# Patient Record
Sex: FEMALE | Race: BLACK OR AFRICAN AMERICAN | NOT HISPANIC OR LATINO | URBAN - METROPOLITAN AREA
[De-identification: names, ages, dates, MRNs, and addresses within clinical notes are randomized per-mention and may not be internally consistent; named-entity substitution may affect disease eponyms.]

---

## 2019-02-08 ENCOUNTER — INPATIENT (INPATIENT)
Facility: HOSPITAL | Age: 84
LOS: 2 days | Discharge: HOME | End: 2019-02-11
Attending: INTERNAL MEDICINE | Admitting: INTERNAL MEDICINE
Payer: MEDICARE

## 2019-02-08 VITALS
OXYGEN SATURATION: 98 % | RESPIRATION RATE: 17 BRPM | DIASTOLIC BLOOD PRESSURE: 78 MMHG | WEIGHT: 164.91 LBS | SYSTOLIC BLOOD PRESSURE: 174 MMHG | HEIGHT: 63 IN | HEART RATE: 80 BPM | TEMPERATURE: 101 F

## 2019-02-08 PROCEDURE — 99285 EMERGENCY DEPT VISIT HI MDM: CPT

## 2019-02-09 DIAGNOSIS — E78.00 PURE HYPERCHOLESTEROLEMIA, UNSPECIFIED: ICD-10-CM

## 2019-02-09 DIAGNOSIS — I10 ESSENTIAL (PRIMARY) HYPERTENSION: ICD-10-CM

## 2019-02-09 DIAGNOSIS — Z29.9 ENCOUNTER FOR PROPHYLACTIC MEASURES, UNSPECIFIED: ICD-10-CM

## 2019-02-09 DIAGNOSIS — J15.9 UNSPECIFIED BACTERIAL PNEUMONIA: ICD-10-CM

## 2019-02-09 LAB
ALBUMIN SERPL ELPH-MCNC: 3.6 G/DL — SIGNIFICANT CHANGE UP (ref 3.3–5)
ALP SERPL-CCNC: 76 U/L — SIGNIFICANT CHANGE UP (ref 40–120)
ALT FLD-CCNC: 23 U/L — SIGNIFICANT CHANGE UP (ref 12–78)
ANION GAP SERPL CALC-SCNC: 12 MMOL/L — SIGNIFICANT CHANGE UP (ref 5–17)
ANION GAP SERPL CALC-SCNC: 9 MMOL/L — SIGNIFICANT CHANGE UP (ref 5–17)
APTT BLD: 34.7 SEC — SIGNIFICANT CHANGE UP (ref 28.5–37)
AST SERPL-CCNC: 19 U/L — SIGNIFICANT CHANGE UP (ref 15–37)
BASOPHILS # BLD AUTO: 0.08 K/UL — SIGNIFICANT CHANGE UP (ref 0–0.2)
BASOPHILS NFR BLD AUTO: 0.5 % — SIGNIFICANT CHANGE UP (ref 0–2)
BILIRUB SERPL-MCNC: 0.4 MG/DL — SIGNIFICANT CHANGE UP (ref 0.2–1.2)
BUN SERPL-MCNC: 29 MG/DL — HIGH (ref 7–23)
BUN SERPL-MCNC: 36 MG/DL — HIGH (ref 7–23)
CALCIUM SERPL-MCNC: 8.4 MG/DL — LOW (ref 8.5–10.1)
CALCIUM SERPL-MCNC: 9.1 MG/DL — SIGNIFICANT CHANGE UP (ref 8.5–10.1)
CHLORIDE SERPL-SCNC: 109 MMOL/L — HIGH (ref 96–108)
CHLORIDE SERPL-SCNC: 110 MMOL/L — HIGH (ref 96–108)
CO2 SERPL-SCNC: 23 MMOL/L — SIGNIFICANT CHANGE UP (ref 22–31)
CO2 SERPL-SCNC: 24 MMOL/L — SIGNIFICANT CHANGE UP (ref 22–31)
CREAT SERPL-MCNC: 1.32 MG/DL — HIGH (ref 0.5–1.3)
CREAT SERPL-MCNC: 1.34 MG/DL — HIGH (ref 0.5–1.3)
EOSINOPHIL # BLD AUTO: 0.13 K/UL — SIGNIFICANT CHANGE UP (ref 0–0.5)
EOSINOPHIL NFR BLD AUTO: 0.8 % — SIGNIFICANT CHANGE UP (ref 0–6)
FLU A RESULT: SIGNIFICANT CHANGE UP
FLU A RESULT: SIGNIFICANT CHANGE UP
FLUAV AG NPH QL: SIGNIFICANT CHANGE UP
FLUBV AG NPH QL: SIGNIFICANT CHANGE UP
GLUCOSE SERPL-MCNC: 115 MG/DL — HIGH (ref 70–99)
GLUCOSE SERPL-MCNC: 92 MG/DL — SIGNIFICANT CHANGE UP (ref 70–99)
HCT VFR BLD CALC: 31.7 % — LOW (ref 34.5–45)
HCT VFR BLD CALC: 35.8 % — SIGNIFICANT CHANGE UP (ref 34.5–45)
HGB BLD-MCNC: 10.1 G/DL — LOW (ref 11.5–15.5)
HGB BLD-MCNC: 11.8 G/DL — SIGNIFICANT CHANGE UP (ref 11.5–15.5)
IMM GRANULOCYTES NFR BLD AUTO: 0.4 % — SIGNIFICANT CHANGE UP (ref 0–1.5)
INR BLD: 0.95 RATIO — SIGNIFICANT CHANGE UP (ref 0.88–1.16)
LACTATE SERPL-SCNC: 0.8 MMOL/L — SIGNIFICANT CHANGE UP (ref 0.7–2)
LIDOCAIN IGE QN: 251 U/L — SIGNIFICANT CHANGE UP (ref 73–393)
LYMPHOCYTES # BLD AUTO: 1.25 K/UL — SIGNIFICANT CHANGE UP (ref 1–3.3)
LYMPHOCYTES # BLD AUTO: 8 % — LOW (ref 13–44)
MCHC RBC-ENTMCNC: 29.6 PG — SIGNIFICANT CHANGE UP (ref 27–34)
MCHC RBC-ENTMCNC: 30.1 PG — SIGNIFICANT CHANGE UP (ref 27–34)
MCHC RBC-ENTMCNC: 31.9 GM/DL — LOW (ref 32–36)
MCHC RBC-ENTMCNC: 33 GM/DL — SIGNIFICANT CHANGE UP (ref 32–36)
MCV RBC AUTO: 91.3 FL — SIGNIFICANT CHANGE UP (ref 80–100)
MCV RBC AUTO: 93 FL — SIGNIFICANT CHANGE UP (ref 80–100)
MONOCYTES # BLD AUTO: 1.33 K/UL — HIGH (ref 0–0.9)
MONOCYTES NFR BLD AUTO: 8.5 % — SIGNIFICANT CHANGE UP (ref 2–14)
NEUTROPHILS # BLD AUTO: 12.85 K/UL — HIGH (ref 1.8–7.4)
NEUTROPHILS NFR BLD AUTO: 81.8 % — HIGH (ref 43–77)
NRBC # BLD: 0 /100 WBCS — SIGNIFICANT CHANGE UP (ref 0–0)
NRBC # BLD: 0 /100 WBCS — SIGNIFICANT CHANGE UP (ref 0–0)
PLATELET # BLD AUTO: 230 K/UL — SIGNIFICANT CHANGE UP (ref 150–400)
PLATELET # BLD AUTO: 253 K/UL — SIGNIFICANT CHANGE UP (ref 150–400)
POTASSIUM SERPL-MCNC: 4.1 MMOL/L — SIGNIFICANT CHANGE UP (ref 3.5–5.3)
POTASSIUM SERPL-MCNC: 4.2 MMOL/L — SIGNIFICANT CHANGE UP (ref 3.5–5.3)
POTASSIUM SERPL-SCNC: 4.1 MMOL/L — SIGNIFICANT CHANGE UP (ref 3.5–5.3)
POTASSIUM SERPL-SCNC: 4.2 MMOL/L — SIGNIFICANT CHANGE UP (ref 3.5–5.3)
PROCALCITONIN SERPL-MCNC: 0.12 NG/ML — HIGH (ref 0.02–0.1)
PROT SERPL-MCNC: 7.8 GM/DL — SIGNIFICANT CHANGE UP (ref 6–8.3)
PROTHROM AB SERPL-ACNC: 10.6 SEC — SIGNIFICANT CHANGE UP (ref 10–12.9)
RAPID RVP RESULT: SIGNIFICANT CHANGE UP
RBC # BLD: 3.41 M/UL — LOW (ref 3.8–5.2)
RBC # BLD: 3.92 M/UL — SIGNIFICANT CHANGE UP (ref 3.8–5.2)
RBC # FLD: 14.2 % — SIGNIFICANT CHANGE UP (ref 10.3–14.5)
RBC # FLD: 14.5 % — SIGNIFICANT CHANGE UP (ref 10.3–14.5)
RSV RESULT: SIGNIFICANT CHANGE UP
RSV RNA RESP QL NAA+PROBE: SIGNIFICANT CHANGE UP
SODIUM SERPL-SCNC: 141 MMOL/L — SIGNIFICANT CHANGE UP (ref 135–145)
SODIUM SERPL-SCNC: 146 MMOL/L — HIGH (ref 135–145)
TROPONIN I SERPL-MCNC: <.015 NG/ML — SIGNIFICANT CHANGE UP (ref 0.01–0.04)
WBC # BLD: 15.71 K/UL — HIGH (ref 3.8–10.5)
WBC # BLD: 17.78 K/UL — HIGH (ref 3.8–10.5)
WBC # FLD AUTO: 15.71 K/UL — HIGH (ref 3.8–10.5)
WBC # FLD AUTO: 17.78 K/UL — HIGH (ref 3.8–10.5)

## 2019-02-09 PROCEDURE — 71045 X-RAY EXAM CHEST 1 VIEW: CPT | Mod: 26

## 2019-02-09 PROCEDURE — 99223 1ST HOSP IP/OBS HIGH 75: CPT | Mod: AI

## 2019-02-09 PROCEDURE — 12345: CPT | Mod: NC

## 2019-02-09 PROCEDURE — 76705 ECHO EXAM OF ABDOMEN: CPT | Mod: 26

## 2019-02-09 PROCEDURE — 76700 US EXAM ABDOM COMPLETE: CPT | Mod: 26

## 2019-02-09 PROCEDURE — 74177 CT ABD & PELVIS W/CONTRAST: CPT | Mod: 26

## 2019-02-09 PROCEDURE — 71275 CT ANGIOGRAPHY CHEST: CPT | Mod: 26

## 2019-02-09 RX ORDER — HEPARIN SODIUM 5000 [USP'U]/ML
5000 INJECTION INTRAVENOUS; SUBCUTANEOUS EVERY 8 HOURS
Qty: 0 | Refills: 0 | Status: DISCONTINUED | OUTPATIENT
Start: 2019-02-09 | End: 2019-02-11

## 2019-02-09 RX ORDER — SODIUM CHLORIDE 9 MG/ML
500 INJECTION INTRAMUSCULAR; INTRAVENOUS; SUBCUTANEOUS ONCE
Qty: 0 | Refills: 0 | Status: COMPLETED | OUTPATIENT
Start: 2019-02-09 | End: 2019-02-09

## 2019-02-09 RX ORDER — ACETAMINOPHEN 500 MG
650 TABLET ORAL ONCE
Qty: 0 | Refills: 0 | Status: COMPLETED | OUTPATIENT
Start: 2019-02-09 | End: 2019-02-09

## 2019-02-09 RX ORDER — IOHEXOL 300 MG/ML
30 INJECTION, SOLUTION INTRAVENOUS ONCE
Qty: 0 | Refills: 0 | Status: COMPLETED | OUTPATIENT
Start: 2019-02-09 | End: 2019-02-09

## 2019-02-09 RX ORDER — AZITHROMYCIN 500 MG/1
500 TABLET, FILM COATED ORAL ONCE
Qty: 0 | Refills: 0 | Status: COMPLETED | OUTPATIENT
Start: 2019-02-09 | End: 2019-02-09

## 2019-02-09 RX ORDER — ACETAMINOPHEN 500 MG
650 TABLET ORAL EVERY 6 HOURS
Qty: 0 | Refills: 0 | Status: DISCONTINUED | OUTPATIENT
Start: 2019-02-09 | End: 2019-02-11

## 2019-02-09 RX ORDER — CEFTRIAXONE 500 MG/1
1 INJECTION, POWDER, FOR SOLUTION INTRAMUSCULAR; INTRAVENOUS EVERY 24 HOURS
Qty: 0 | Refills: 0 | Status: DISCONTINUED | OUTPATIENT
Start: 2019-02-10 | End: 2019-02-11

## 2019-02-09 RX ORDER — CEFTRIAXONE 500 MG/1
1 INJECTION, POWDER, FOR SOLUTION INTRAMUSCULAR; INTRAVENOUS ONCE
Qty: 0 | Refills: 0 | Status: COMPLETED | OUTPATIENT
Start: 2019-02-09 | End: 2019-02-09

## 2019-02-09 RX ORDER — PANTOPRAZOLE SODIUM 20 MG/1
40 TABLET, DELAYED RELEASE ORAL ONCE
Qty: 0 | Refills: 0 | Status: COMPLETED | OUTPATIENT
Start: 2019-02-09 | End: 2019-02-09

## 2019-02-09 RX ORDER — METOPROLOL TARTRATE 50 MG
25 TABLET ORAL DAILY
Qty: 0 | Refills: 0 | Status: DISCONTINUED | OUTPATIENT
Start: 2019-02-09 | End: 2019-02-11

## 2019-02-09 RX ORDER — SODIUM CHLORIDE 9 MG/ML
3 INJECTION INTRAMUSCULAR; INTRAVENOUS; SUBCUTANEOUS ONCE
Qty: 0 | Refills: 0 | Status: COMPLETED | OUTPATIENT
Start: 2019-02-09 | End: 2019-02-09

## 2019-02-09 RX ORDER — HYDROCHLOROTHIAZIDE 25 MG
25 TABLET ORAL DAILY
Qty: 0 | Refills: 0 | Status: DISCONTINUED | OUTPATIENT
Start: 2019-02-09 | End: 2019-02-11

## 2019-02-09 RX ORDER — AZITHROMYCIN 500 MG/1
500 TABLET, FILM COATED ORAL DAILY
Qty: 0 | Refills: 0 | Status: DISCONTINUED | OUTPATIENT
Start: 2019-02-10 | End: 2019-02-11

## 2019-02-09 RX ORDER — IPRATROPIUM/ALBUTEROL SULFATE 18-103MCG
3 AEROSOL WITH ADAPTER (GRAM) INHALATION EVERY 6 HOURS
Qty: 0 | Refills: 0 | Status: DISCONTINUED | OUTPATIENT
Start: 2019-02-09 | End: 2019-02-11

## 2019-02-09 RX ORDER — ONDANSETRON 8 MG/1
4 TABLET, FILM COATED ORAL ONCE
Qty: 0 | Refills: 0 | Status: COMPLETED | OUTPATIENT
Start: 2019-02-09 | End: 2019-02-09

## 2019-02-09 RX ORDER — ASPIRIN/CALCIUM CARB/MAGNESIUM 324 MG
81 TABLET ORAL DAILY
Qty: 0 | Refills: 0 | Status: DISCONTINUED | OUTPATIENT
Start: 2019-02-09 | End: 2019-02-11

## 2019-02-09 RX ORDER — SODIUM CHLORIDE 9 MG/ML
1000 INJECTION INTRAMUSCULAR; INTRAVENOUS; SUBCUTANEOUS ONCE
Qty: 0 | Refills: 0 | Status: COMPLETED | OUTPATIENT
Start: 2019-02-09 | End: 2019-02-09

## 2019-02-09 RX ORDER — HYDRALAZINE HCL 50 MG
50 TABLET ORAL THREE TIMES A DAY
Qty: 0 | Refills: 0 | Status: DISCONTINUED | OUTPATIENT
Start: 2019-02-09 | End: 2019-02-11

## 2019-02-09 RX ORDER — ATORVASTATIN CALCIUM 80 MG/1
20 TABLET, FILM COATED ORAL AT BEDTIME
Qty: 0 | Refills: 0 | Status: DISCONTINUED | OUTPATIENT
Start: 2019-02-09 | End: 2019-02-11

## 2019-02-09 RX ORDER — LOSARTAN POTASSIUM 100 MG/1
100 TABLET, FILM COATED ORAL DAILY
Qty: 0 | Refills: 0 | Status: DISCONTINUED | OUTPATIENT
Start: 2019-02-09 | End: 2019-02-11

## 2019-02-09 RX ADMIN — SODIUM CHLORIDE 1000 MILLILITER(S): 9 INJECTION INTRAMUSCULAR; INTRAVENOUS; SUBCUTANEOUS at 00:43

## 2019-02-09 RX ADMIN — SODIUM CHLORIDE 500 MILLILITER(S): 9 INJECTION INTRAMUSCULAR; INTRAVENOUS; SUBCUTANEOUS at 17:32

## 2019-02-09 RX ADMIN — SODIUM CHLORIDE 1000 MILLILITER(S): 9 INJECTION INTRAMUSCULAR; INTRAVENOUS; SUBCUTANEOUS at 01:15

## 2019-02-09 RX ADMIN — SODIUM CHLORIDE 3 MILLILITER(S): 9 INJECTION INTRAMUSCULAR; INTRAVENOUS; SUBCUTANEOUS at 00:43

## 2019-02-09 RX ADMIN — ATORVASTATIN CALCIUM 20 MILLIGRAM(S): 80 TABLET, FILM COATED ORAL at 21:38

## 2019-02-09 RX ADMIN — Medication 650 MILLIGRAM(S): at 05:14

## 2019-02-09 RX ADMIN — Medication 650 MILLIGRAM(S): at 04:14

## 2019-02-09 RX ADMIN — IOHEXOL 30 MILLILITER(S): 300 INJECTION, SOLUTION INTRAVENOUS at 01:15

## 2019-02-09 RX ADMIN — HEPARIN SODIUM 5000 UNIT(S): 5000 INJECTION INTRAVENOUS; SUBCUTANEOUS at 14:12

## 2019-02-09 RX ADMIN — HEPARIN SODIUM 5000 UNIT(S): 5000 INJECTION INTRAVENOUS; SUBCUTANEOUS at 21:39

## 2019-02-09 RX ADMIN — PANTOPRAZOLE SODIUM 40 MILLIGRAM(S): 20 TABLET, DELAYED RELEASE ORAL at 00:43

## 2019-02-09 RX ADMIN — Medication 50 MILLIGRAM(S): at 21:38

## 2019-02-09 RX ADMIN — ONDANSETRON 4 MILLIGRAM(S): 8 TABLET, FILM COATED ORAL at 00:43

## 2019-02-09 RX ADMIN — SODIUM CHLORIDE 1000 MILLILITER(S): 9 INJECTION INTRAMUSCULAR; INTRAVENOUS; SUBCUTANEOUS at 15:14

## 2019-02-09 RX ADMIN — CEFTRIAXONE 100 GRAM(S): 500 INJECTION, POWDER, FOR SOLUTION INTRAMUSCULAR; INTRAVENOUS at 07:27

## 2019-02-09 RX ADMIN — Medication 81 MILLIGRAM(S): at 11:31

## 2019-02-09 RX ADMIN — AZITHROMYCIN 255 MILLIGRAM(S): 500 TABLET, FILM COATED ORAL at 11:31

## 2019-02-09 RX ADMIN — Medication 650 MILLIGRAM(S): at 23:09

## 2019-02-09 NOTE — H&P ADULT - NSHPREVIEWOFSYSTEMS_GEN_ALL_CORE
No photophobia/eye pain/changes in vision,  No palpitations, no SOB/cough/wheeze/stridor, No abdominal pain, No N/V/D, no dysuria/frequency/discharge, No neck/back pain, no rash, no changes in neurological status/function.

## 2019-02-09 NOTE — ED PROVIDER NOTE - OBJECTIVE STATEMENT
Pertinent PMH/PSH/FHx/SHx and Review of Systems contained within:  84 yo f with pmh of htn, hld and temporal arteritis resolved presents in ED c/o right sided chest pain associated with fever. pain worsens with deep inspiration. No aggravating or relieving factors, No fever/chills, No photophobia/eye pain/changes in vision, No ear pain/sore throat/dysphagia, No palpitations, no SOB/cough/wheeze/stridor, No abdominal pain, No N/V/D, no dysuria/frequency/discharge, No neck/back pain, no rash, no changes in neurological status/function.

## 2019-02-09 NOTE — H&P ADULT - NSHPLABSRESULTS_GEN_ALL_CORE
Vital Signs Last 24 Hrs  T(C): 37.6 (09 Feb 2019 04:10), Max: 38.6 (08 Feb 2019 23:44)  T(F): 99.6 (09 Feb 2019 04:10), Max: 101.5 (08 Feb 2019 23:44)  HR: 68 (09 Feb 2019 04:10) (68 - 80)  BP: 152/67 (09 Feb 2019 04:10) (152/67 - 174/78)  BP(mean): --  RR: 18 (09 Feb 2019 04:10) (17 - 18)  SpO2: 97% (09 Feb 2019 04:10) (97% - 98%)        LABS:                        11.8   15.71 )-----------( 253      ( 09 Feb 2019 01:11 )             35.8     02-09    141  |  109<H>  |  36<H>  ----------------------------<  115<H>  4.1   |  23  |  1.32<H>    Ca    9.1      09 Feb 2019 01:11    TPro  7.8  /  Alb  3.6  /  TBili  0.4  /  DBili  x   /  AST  19  /  ALT  23  /  AlkPhos  76  02-09    PT/INR - ( 09 Feb 2019 01:11 )   PT: 10.6 sec;   INR: 0.95 ratio         PTT - ( 09 Feb 2019 01:11 )  PTT:34.7 sec      RADIOLOGY & ADDITIONAL STUDIES:    CXR:  RLL infiltrate

## 2019-02-09 NOTE — H&P ADULT - HISTORY OF PRESENT ILLNESS
83 year old woman with PMH HTN, HLD, and migraines presents with sharp right lower pleuritic chest pain and subjective fevers with chills for the last 2-3 days.  She denies wheezing, SOB, cough.  She denies recent travel, sick contacts, palpitations, diaphoresis, lightheadedness.    In the ED, CXR shows RLL infiltrate, tmax 101.5, WBC 15.71.

## 2019-02-09 NOTE — ED ADULT NURSE NOTE - NSIMPLEMENTINTERV_GEN_ALL_ED
Implemented All Universal Safety Interventions:  [place] to call system. Call bell, personal items and telephone within reach. Instruct patient to call for assistance. Room bathroom lighting operational. Non-slip footwear when patient is off stretcher. Physically safe environment: no spills, clutter or unnecessary equipment. Stretcher in lowest position, wheels locked, appropriate side rails in place.

## 2019-02-09 NOTE — CHART NOTE - NSCHARTNOTEFT_GEN_A_CORE
Patient seen and examined bedside. Patient seen and examined bedside.    Vitals stable.     GENERAL: NAD, sitting in bed comfortably   HEAD:  Atraumatic, Normocephalic  EYES: EOMI, PERRLA, conjunctiva and sclera clear  ENMT: Moist mucous membranes  NECK: Supple, No JVD  NERVOUS SYSTEM:  Alert & Oriented X3, no focal neuro deficits   CHEST/LUNG: Clear to auscultation bilaterally; No rales, rhonchi, wheezing, or rubs  HEART: Regular rate and rhythm; No murmurs, rubs, or gallops  ABDOMEN: Soft, Nontender, Nondistended; +BS  EXTREMITIES:  2+ Peripheral Pulses b/l, No clubbing, cyanosis, or edema b/l, no calf tenderness b/l    Labs and imaging reviewed.     Given recent travel history 2 weeks back, obtain CT angio to rule out PE and b/l LE doppler to r/o DVT.   follow procal  continue with abx   the rest of management per H&P

## 2019-02-09 NOTE — H&P ADULT - PROBLEM SELECTOR PLAN 1
Continue Rocephin 1gm IVPB QDaily.  Continue Zithromax 500mg PO QDAILY.  O2 via NC@ 2L/Min, keep sat >94% at all times.  Follow Blood Cx 2  Albuterol/Atrovent 1 unit neb Q6hrs.  Tylenol 650mg po q6hrs PRN for fever.  Robitussin 10ml PO Q8hrs PRN for cough.

## 2019-02-09 NOTE — H&P ADULT - ASSESSMENT
83 year old woman with PMH HTN, HLD, and migraines presents with sharp right lower pleuritic chest pain and subjective fevers with chills for the last 2-3 days.  Patient will require admission for at least 2 midnights as detailed below:    IMPROVE VTE Individual Risk Assessment          RISK                                                          Points    [  ] Previous VTE                                                3    [  ] Thrombophilia                                             2    [  ] Lower limb paralysis                                    2        (unable to hold up >15 seconds)      [  ] Current Cancer                                             2         (within 6 months)    [ x ] Immobilization > 24 hrs                              1    [  ] ICU/CCU stay > 24 hours                            1    [ x ] Age > 60                                                    1    IMPROVE VTE Score ___2______

## 2019-02-09 NOTE — H&P ADULT - NSHPPHYSICALEXAM_GEN_ALL_CORE
GENERAL: NAD, well-groomed, well-developed  HEAD:  Atraumatic, Normocephalic  EYES: EOMI, PERRLA, conjunctiva and sclera clear  ENMT: No tonsillar erythema, exudates, or enlargement; Moist mucous membranes, No lesions  NECK: Supple, No JVD, Normal thyroid  NERVOUS SYSTEM:  Alert & Oriented X3, Good concentration  CHEST/LUNG: Clear to ascultation bilaterally; No rales, rhonchi, wheezing, or rubs  HEART: Regular rate and rhythm; No murmurs, rubs, or gallops  ABDOMEN: Soft, Nontender, Nondistended; Bowel sounds present  EXTREMITIES: no clubbing, cyanosis, or edema  SKIN: no rashes or lesions   PSYCH: normal affect and behavior

## 2019-02-10 LAB
ANION GAP SERPL CALC-SCNC: 6 MMOL/L — SIGNIFICANT CHANGE UP (ref 5–17)
APPEARANCE UR: CLEAR — SIGNIFICANT CHANGE UP
BILIRUB UR-MCNC: NEGATIVE — SIGNIFICANT CHANGE UP
BUN SERPL-MCNC: 29 MG/DL — HIGH (ref 7–23)
CALCIUM SERPL-MCNC: 8.9 MG/DL — SIGNIFICANT CHANGE UP (ref 8.5–10.1)
CHLORIDE SERPL-SCNC: 111 MMOL/L — HIGH (ref 96–108)
CO2 SERPL-SCNC: 24 MMOL/L — SIGNIFICANT CHANGE UP (ref 22–31)
COLOR SPEC: YELLOW — SIGNIFICANT CHANGE UP
CREAT SERPL-MCNC: 1.71 MG/DL — HIGH (ref 0.5–1.3)
DIFF PNL FLD: NEGATIVE — SIGNIFICANT CHANGE UP
GLUCOSE SERPL-MCNC: 117 MG/DL — HIGH (ref 70–99)
GLUCOSE UR QL: NEGATIVE MG/DL — SIGNIFICANT CHANGE UP
HCT VFR BLD CALC: 33.6 % — LOW (ref 34.5–45)
HGB BLD-MCNC: 10.8 G/DL — LOW (ref 11.5–15.5)
KETONES UR-MCNC: NEGATIVE — SIGNIFICANT CHANGE UP
LEUKOCYTE ESTERASE UR-ACNC: NEGATIVE — SIGNIFICANT CHANGE UP
MAGNESIUM SERPL-MCNC: 2.3 MG/DL — SIGNIFICANT CHANGE UP (ref 1.6–2.6)
MCHC RBC-ENTMCNC: 30 PG — SIGNIFICANT CHANGE UP (ref 27–34)
MCHC RBC-ENTMCNC: 32.1 GM/DL — SIGNIFICANT CHANGE UP (ref 32–36)
MCV RBC AUTO: 93.3 FL — SIGNIFICANT CHANGE UP (ref 80–100)
NITRITE UR-MCNC: NEGATIVE — SIGNIFICANT CHANGE UP
NRBC # BLD: 0 /100 WBCS — SIGNIFICANT CHANGE UP (ref 0–0)
PH UR: 5 — SIGNIFICANT CHANGE UP (ref 5–8)
PHOSPHATE SERPL-MCNC: 3.7 MG/DL — SIGNIFICANT CHANGE UP (ref 2.5–4.5)
PLATELET # BLD AUTO: 238 K/UL — SIGNIFICANT CHANGE UP (ref 150–400)
POTASSIUM SERPL-MCNC: 4.2 MMOL/L — SIGNIFICANT CHANGE UP (ref 3.5–5.3)
POTASSIUM SERPL-SCNC: 4.2 MMOL/L — SIGNIFICANT CHANGE UP (ref 3.5–5.3)
PROT UR-MCNC: NEGATIVE MG/DL — SIGNIFICANT CHANGE UP
RBC # BLD: 3.6 M/UL — LOW (ref 3.8–5.2)
RBC # FLD: 14.5 % — SIGNIFICANT CHANGE UP (ref 10.3–14.5)
SODIUM SERPL-SCNC: 141 MMOL/L — SIGNIFICANT CHANGE UP (ref 135–145)
SP GR SPEC: 1.01 — SIGNIFICANT CHANGE UP (ref 1.01–1.02)
UROBILINOGEN FLD QL: NEGATIVE MG/DL — SIGNIFICANT CHANGE UP
WBC # BLD: 16.1 K/UL — HIGH (ref 3.8–10.5)
WBC # FLD AUTO: 16.1 K/UL — HIGH (ref 3.8–10.5)

## 2019-02-10 PROCEDURE — 93970 EXTREMITY STUDY: CPT | Mod: 26

## 2019-02-10 PROCEDURE — 99232 SBSQ HOSP IP/OBS MODERATE 35: CPT

## 2019-02-10 RX ORDER — SODIUM CHLORIDE 9 MG/ML
500 INJECTION INTRAMUSCULAR; INTRAVENOUS; SUBCUTANEOUS ONCE
Qty: 0 | Refills: 0 | Status: COMPLETED | OUTPATIENT
Start: 2019-02-10 | End: 2019-02-10

## 2019-02-10 RX ADMIN — AZITHROMYCIN 500 MILLIGRAM(S): 500 TABLET, FILM COATED ORAL at 11:10

## 2019-02-10 RX ADMIN — HEPARIN SODIUM 5000 UNIT(S): 5000 INJECTION INTRAVENOUS; SUBCUTANEOUS at 05:33

## 2019-02-10 RX ADMIN — Medication 50 MILLIGRAM(S): at 21:41

## 2019-02-10 RX ADMIN — SODIUM CHLORIDE 500 MILLILITER(S): 9 INJECTION INTRAMUSCULAR; INTRAVENOUS; SUBCUTANEOUS at 19:25

## 2019-02-10 RX ADMIN — ATORVASTATIN CALCIUM 20 MILLIGRAM(S): 80 TABLET, FILM COATED ORAL at 21:41

## 2019-02-10 RX ADMIN — CEFTRIAXONE 100 GRAM(S): 500 INJECTION, POWDER, FOR SOLUTION INTRAMUSCULAR; INTRAVENOUS at 05:31

## 2019-02-10 RX ADMIN — HEPARIN SODIUM 5000 UNIT(S): 5000 INJECTION INTRAVENOUS; SUBCUTANEOUS at 13:00

## 2019-02-10 RX ADMIN — Medication 81 MILLIGRAM(S): at 11:10

## 2019-02-10 RX ADMIN — Medication 50 MILLIGRAM(S): at 13:00

## 2019-02-10 RX ADMIN — LOSARTAN POTASSIUM 100 MILLIGRAM(S): 100 TABLET, FILM COATED ORAL at 11:10

## 2019-02-10 RX ADMIN — Medication 25 MILLIGRAM(S): at 11:10

## 2019-02-10 RX ADMIN — HEPARIN SODIUM 5000 UNIT(S): 5000 INJECTION INTRAVENOUS; SUBCUTANEOUS at 21:42

## 2019-02-10 RX ADMIN — Medication 25 MILLIGRAM(S): at 05:32

## 2019-02-10 NOTE — PROGRESS NOTE ADULT - ASSESSMENT
83 year old woman with PMH HTN, HLD, and migraines presents with sharp right lower pleuritic chest pain and subjective fevers with chills for the last 2-3 days.

## 2019-02-10 NOTE — PROGRESS NOTE ADULT - SUBJECTIVE AND OBJECTIVE BOX
HPI:  83 year old woman with PMH HTN, HLD, and migraines presents with sharp right lower pleuritic chest pain and subjective fevers with chills for the last 2-3 days.  She denies wheezing, SOB, cough.  She denies recent travel, sick contacts, palpitations, diaphoresis, lightheadedness.    In the ED, CXR shows RLL infiltrate, tmax 101.5, WBC 15.71. (09 Feb 2019 06:10)      SUBJECTIVE & OBJECTIVE:   Pt seen and examined at bedside.   No overnight events.   No complaints. States the Rt chest pain has improved significantly.   Denies N/V, dizziness, HA, CP, palpitations, SOB, abdominal pain, dysuria, diarrhea, constipation.     PHYSICAL EXAM:  Vitals noted.     GENERAL: NAD, sitting in bed comfortably   HEAD:  Atraumatic, Normocephalic  EYES: EOMI, PERRLA, conjunctiva and sclera clear  ENMT: Moist mucous membranes  NECK: Supple, No JVD  NERVOUS SYSTEM:  Alert & Oriented X3, no focal neuro deficits   CHEST/LUNG: Clear to auscultation bilaterally; No rales, rhonchi, wheezing, or rubs  HEART: Regular rate and rhythm; No murmurs, rubs, or gallops  ABDOMEN: Soft, Nontender, Nondistended; +BS  EXTREMITIES:  2+ Peripheral Pulses b/l, No clubbing, cyanosis, or edema b/l, no calf tenderness b/l    MEDICATIONS  (STANDING):  aspirin  chewable 81 milliGRAM(s) Oral daily  atorvastatin 20 milliGRAM(s) Oral at bedtime  azithromycin   Tablet 500 milliGRAM(s) Oral daily  cefTRIAXone   IVPB 1 Gram(s) IV Intermittent every 24 hours  heparin  Injectable 5000 Unit(s) SubCutaneous every 8 hours  hydrALAZINE 50 milliGRAM(s) Oral three times a day  hydrochlorothiazide 25 milliGRAM(s) Oral daily  losartan 100 milliGRAM(s) Oral daily  metoprolol succinate ER 25 milliGRAM(s) Oral daily    MEDICATIONS  (PRN):  acetaminophen   Tablet .. 650 milliGRAM(s) Oral every 6 hours PRN Temp greater or equal to 38C (100.4F), Mild Pain (1 - 3)  ALBUTerol/ipratropium for Nebulization 3 milliLiter(s) Nebulizer every 6 hours PRN Shortness of Breath and/or Wheezing  guaiFENesin    Syrup 200 milliGRAM(s) Oral every 6 hours PRN Cough      Labs and imaging reviewed.     Procalcitonin, Serum (02.09.19 @ 22:06)    Procalcitonin, Serum: 0.12: This assay is intended for use to determine the change of PCT over time  as an aid in assessing the cumulative 28-day risk of all-cause mortality  for patients diagnosed with severe sepsis or septic shock in the ICU, or  when obtained in the emergency department or other medical wards prior to  ICU admission. This assay was performed by the Roche HiConversion.ruHMS PCT  assay. ng/mL               RADIOLOGY & ADDITIONAL TESTS:  < from: CT Angio Chest w/ IV Cont (02.09.19 @ 16:35) >  IMPRESSION:      1. No CT evidence of pulmonary embolism.  2. Bilateral lower lobe bronchiectasis and fibrotic changes with   superimposed patchy infiltrates in both lungs. Pulmonary hypertension is   suggested with prominence of the pulmonary arteries.    < end of copied text >    < from: CT Abdomen and Pelvis w/ Oral Cont and w/ IV Cont (02.09.19 @ 02:29) >    FINDINGS:    LOWER CHEST: Bibasilar atelectasis. Mild cardiomegaly. Trace pericardial   fluid.    LIVER: Within normal limits.  BILE DUCTS: Normal caliber.  GALLBLADDER: Cholecystectomy.  SPLEEN: Within normal limits.  PANCREAS: Within normal limits.  ADRENALS: Within normal limits.  KIDNEYS/URETERS: No hydronephrosisor urinary tract calculi. 1.1 cm   cystic structure adjacent to the distal left ureter is of uncertain   etiology but likely of no clinical significance (2-5).    BLADDER: Within normal limits.  REPRODUCTIVE ORGANS: Within normal limits.     BOWEL: No bowel obstruction. Appendix not identified. Colonic   diverticulosis. Small hiatal hernia.  PERITONEUM: No ascites.  VESSELS:  Mild atherosclerotic disease.  LYMPH NODES: No lymphadenopathy.    ABDOMINAL WALL: Within normal limits.  BONES: No suspicious osseous lesion.  OTHER:  None                                                                 IMPRESSION: No acute CT findings to account for etiology of patient's   symptoms.    < end of copied text >        Imaging Personally Reviewed:  [x ] YES  [ ] NO    Consultant(s) Notes Reviewed:  [x ] YES  [ ] NO    Care Discussed with Consultants/Other Providers [x ] YES  [ ] NO    Care discussed in detail with patient.  All questions and concerns addressed.

## 2019-02-10 NOTE — PROGRESS NOTE ADULT - PROBLEM SELECTOR PLAN 1
Afebrile, no leukocytosis, procal slightly elevated, cultures NGTD  CT angio obtained due to recent travel history shows b/l Lower lobe infiltrates   continue with azithromycin and Rocephin x 5 days   Albuterol/Atrovent 1 unit neb Q6hrs.  Tylenol 650mg po q6hrs PRN for fever.  Robitussin 10ml PO Q8hrs PRN for cough.

## 2019-02-11 ENCOUNTER — CHART COPY (OUTPATIENT)
Age: 84
End: 2019-02-11

## 2019-02-11 ENCOUNTER — TRANSCRIPTION ENCOUNTER (OUTPATIENT)
Age: 84
End: 2019-02-11

## 2019-02-11 VITALS
DIASTOLIC BLOOD PRESSURE: 64 MMHG | HEART RATE: 62 BPM | SYSTOLIC BLOOD PRESSURE: 145 MMHG | TEMPERATURE: 98 F | OXYGEN SATURATION: 99 % | RESPIRATION RATE: 18 BRPM

## 2019-02-11 DIAGNOSIS — N17.9 ACUTE KIDNEY FAILURE, UNSPECIFIED: ICD-10-CM

## 2019-02-11 LAB
ANION GAP SERPL CALC-SCNC: 7 MMOL/L — SIGNIFICANT CHANGE UP (ref 5–17)
BUN SERPL-MCNC: 30 MG/DL — HIGH (ref 7–23)
CALCIUM SERPL-MCNC: 8.4 MG/DL — LOW (ref 8.5–10.1)
CHLORIDE SERPL-SCNC: 113 MMOL/L — HIGH (ref 96–108)
CO2 SERPL-SCNC: 25 MMOL/L — SIGNIFICANT CHANGE UP (ref 22–31)
CREAT SERPL-MCNC: 1.5 MG/DL — HIGH (ref 0.5–1.3)
GLUCOSE SERPL-MCNC: 87 MG/DL — SIGNIFICANT CHANGE UP (ref 70–99)
HCT VFR BLD CALC: 33 % — LOW (ref 34.5–45)
HGB BLD-MCNC: 10.4 G/DL — LOW (ref 11.5–15.5)
MAGNESIUM SERPL-MCNC: 2.1 MG/DL — SIGNIFICANT CHANGE UP (ref 1.6–2.6)
MCHC RBC-ENTMCNC: 29.3 PG — SIGNIFICANT CHANGE UP (ref 27–34)
MCHC RBC-ENTMCNC: 31.5 GM/DL — LOW (ref 32–36)
MCV RBC AUTO: 93 FL — SIGNIFICANT CHANGE UP (ref 80–100)
NRBC # BLD: 0 /100 WBCS — SIGNIFICANT CHANGE UP (ref 0–0)
PHOSPHATE SERPL-MCNC: 3.4 MG/DL — SIGNIFICANT CHANGE UP (ref 2.5–4.5)
PLATELET # BLD AUTO: 230 K/UL — SIGNIFICANT CHANGE UP (ref 150–400)
POTASSIUM SERPL-MCNC: 4.2 MMOL/L — SIGNIFICANT CHANGE UP (ref 3.5–5.3)
POTASSIUM SERPL-SCNC: 4.2 MMOL/L — SIGNIFICANT CHANGE UP (ref 3.5–5.3)
RBC # BLD: 3.55 M/UL — LOW (ref 3.8–5.2)
RBC # FLD: 14.4 % — SIGNIFICANT CHANGE UP (ref 10.3–14.5)
SODIUM SERPL-SCNC: 145 MMOL/L — SIGNIFICANT CHANGE UP (ref 135–145)
WBC # BLD: 10.26 K/UL — SIGNIFICANT CHANGE UP (ref 3.8–10.5)
WBC # FLD AUTO: 10.26 K/UL — SIGNIFICANT CHANGE UP (ref 3.8–10.5)

## 2019-02-11 PROCEDURE — 99239 HOSP IP/OBS DSCHRG MGMT >30: CPT

## 2019-02-11 RX ORDER — CIPROFLOXACIN LACTATE 400MG/40ML
1 VIAL (ML) INTRAVENOUS
Qty: 5 | Refills: 0
Start: 2019-02-11 | End: 2019-02-15

## 2019-02-11 RX ADMIN — Medication 50 MILLIGRAM(S): at 13:44

## 2019-02-11 RX ADMIN — Medication 50 MILLIGRAM(S): at 05:05

## 2019-02-11 RX ADMIN — Medication 25 MILLIGRAM(S): at 05:05

## 2019-02-11 RX ADMIN — LOSARTAN POTASSIUM 100 MILLIGRAM(S): 100 TABLET, FILM COATED ORAL at 05:05

## 2019-02-11 RX ADMIN — Medication 81 MILLIGRAM(S): at 11:50

## 2019-02-11 RX ADMIN — HEPARIN SODIUM 5000 UNIT(S): 5000 INJECTION INTRAVENOUS; SUBCUTANEOUS at 05:06

## 2019-02-11 RX ADMIN — HEPARIN SODIUM 5000 UNIT(S): 5000 INJECTION INTRAVENOUS; SUBCUTANEOUS at 13:46

## 2019-02-11 RX ADMIN — CEFTRIAXONE 100 GRAM(S): 500 INJECTION, POWDER, FOR SOLUTION INTRAMUSCULAR; INTRAVENOUS at 05:05

## 2019-02-11 RX ADMIN — AZITHROMYCIN 500 MILLIGRAM(S): 500 TABLET, FILM COATED ORAL at 11:50

## 2019-02-11 NOTE — DISCHARGE NOTE PROVIDER - NSDCCPCAREPLAN_GEN_ALL_CORE_FT
PRINCIPAL DISCHARGE DIAGNOSIS  Problem: Community acquired bacterial pneumonia  Assessment and Plan of Treatment: Complete PO ABX  Follow up with PMD      SECONDARY DISCHARGE DIAGNOSES  Problem: Hypercholesteremia  Assessment and Plan of Treatment: Continue medications as prescribed  Follow up with PMD  Heart healthy diet - [email or web address]      Problem: Essential hypertension  Assessment and Plan of Treatment: Continue medications as prescribed  Follow up with PMD  Low-sodium diet  AHA Recipes - [email or web address]

## 2019-02-11 NOTE — DISCHARGE NOTE NURSING/CASE MANAGEMENT/SOCIAL WORK - NSDCDPATPORTLINK_GEN_ALL_CORE
You can access the [facility] Patient Portal, offered by [facility], by registering with the following website: [email or web address]

## 2019-02-11 NOTE — DISCHARGE NOTE PROVIDER - CARE PROVIDER_API CALL
Handy Alvarez)  Internal Medicine  [address] [place] [address]  Phone: [phone]  Fax: [phone]  Follow Up Time: 1 week

## 2019-02-11 NOTE — DISCHARGE NOTE PROVIDER - HOSPITAL COURSE
83 year old woman with PMH HTN, HLD, and migraines presents with sharp right lower pleuritic chest pain and subjective fevers with chills for the last 2-3 days.          Community acquired bacterial pneumonia.  Plan: Afebrile, no leukocytosis, procal slightly elevated, cultures NGTD    CT angio obtained due to recent travel history shows b/l Lower lobe infiltrates     switch to levaquin po, f/u with pcp        Pt with CKD 3, unlikely barak on admit as there is no baseline             45min spent on dc planning

## 2019-02-12 ENCOUNTER — CHART COPY (OUTPATIENT)
Age: 84
End: 2019-02-12

## 2019-02-12 DIAGNOSIS — G43.909 MIGRAINE, UNSPECIFIED, NOT INTRACTABLE, W/OUT STATUS MIGRAINOSUS: ICD-10-CM

## 2019-02-12 LAB
CULTURE RESULTS: NO GROWTH — SIGNIFICANT CHANGE UP
SPECIMEN SOURCE: SIGNIFICANT CHANGE UP

## 2019-02-12 RX ORDER — ASPIRIN 81 MG/1
81 TABLET, CHEWABLE ORAL DAILY
Refills: 0 | Status: ACTIVE | COMMUNITY
Start: 2019-02-12

## 2019-02-12 RX ORDER — CALCIUM CARBONATE/VITAMIN D3 500 MG-2.5
500-100 TABLET,CHEWABLE ORAL
Refills: 0 | Status: ACTIVE | COMMUNITY
Start: 2019-02-12

## 2019-02-12 RX ORDER — CHROMIUM 200 MCG
1000 TABLET ORAL DAILY
Refills: 0 | Status: ACTIVE | COMMUNITY
Start: 2019-02-12

## 2019-02-12 RX ORDER — ROSUVASTATIN CALCIUM 5 MG/1
5 TABLET, FILM COATED ORAL DAILY
Refills: 0 | Status: ACTIVE | COMMUNITY
Start: 2019-02-12

## 2019-02-14 ENCOUNTER — APPOINTMENT (OUTPATIENT)
Age: 84
End: 2019-02-14
Payer: MEDICARE

## 2019-02-14 VITALS
OXYGEN SATURATION: 98 % | BODY MASS INDEX: 29.23 KG/M2 | HEART RATE: 78 BPM | DIASTOLIC BLOOD PRESSURE: 60 MMHG | SYSTOLIC BLOOD PRESSURE: 166 MMHG | RESPIRATION RATE: 16 BRPM | TEMPERATURE: 97.6 F

## 2019-02-14 DIAGNOSIS — E78.5 HYPERLIPIDEMIA, UNSPECIFIED: ICD-10-CM

## 2019-02-14 DIAGNOSIS — R07.9 CHEST PAIN, UNSPECIFIED: ICD-10-CM

## 2019-02-14 DIAGNOSIS — Z79.82 LONG TERM (CURRENT) USE OF ASPIRIN: ICD-10-CM

## 2019-02-14 DIAGNOSIS — J18.9 PNEUMONIA, UNSPECIFIED ORGANISM: ICD-10-CM

## 2019-02-14 DIAGNOSIS — G43.909 MIGRAINE, UNSPECIFIED, NOT INTRACTABLE, WITHOUT STATUS MIGRAINOSUS: ICD-10-CM

## 2019-02-14 DIAGNOSIS — N18.3 CHRONIC KIDNEY DISEASE, STAGE 3 (MODERATE): ICD-10-CM

## 2019-02-14 DIAGNOSIS — Z88.2 ALLERGY STATUS TO SULFONAMIDES: ICD-10-CM

## 2019-02-14 DIAGNOSIS — I12.9 HYPERTENSIVE CHRONIC KIDNEY DISEASE WITH STAGE 1 THROUGH STAGE 4 CHRONIC KIDNEY DISEASE, OR UNSPECIFIED CHRONIC KIDNEY DISEASE: ICD-10-CM

## 2019-02-14 DIAGNOSIS — J15.9 UNSPECIFIED BACTERIAL PNEUMONIA: ICD-10-CM

## 2019-02-14 LAB
CULTURE RESULTS: SIGNIFICANT CHANGE UP
CULTURE RESULTS: SIGNIFICANT CHANGE UP
SPECIMEN SOURCE: SIGNIFICANT CHANGE UP
SPECIMEN SOURCE: SIGNIFICANT CHANGE UP

## 2019-02-14 PROCEDURE — 99348 HOME/RES VST EST LOW MDM 30: CPT

## 2019-02-14 NOTE — PLAN
[FreeTextEntry1] : Reinforced CN’s role/ STARS program and overview of transitional care with patient. Pt educated on topics of importance such as compliance with all provider visits, prescribed medication regimen, and low salt / heart healthy diet. Pt encouraged calling CN with any issues, concerns or questions, also educated to notify CN if experiencing CP, SOB , cough, increased mucus/phlegm production, abdominal discomfort/swelling or difficulty sleeping. Reassurance provided. Will continue to monitor.

## 2019-02-14 NOTE — HISTORY OF PRESENT ILLNESS
[Post-hospitalization from ___ Hospital] : Post-hospitalization from [unfilled] Hospital [Admitted on: ___] : The patient was admitted on [unfilled] [Discharged on ___] : discharged on [unfilled] [Discharge Summary] : discharge summary [Discharge Med List] : discharge medication list [Med Reconciliation] : medication reconciliation has been completed [Patient Contacted By: ____] : and contacted by [unfilled] [FreeTextEntry3] : CC: Sp hospitalization for PNA. Patient is temporarily unable to leave home as it requires a considerable and taxing effort ,and poses risk for fall [FreeTextEntry2] : This is an 83 year old woman with PMH HTN, HLD, and migraines presented to the ED 2/9/19 with sharp right lower pleuritic chest pain and subjective fevers with chills for the last 2-3 days. CT angio obtained due to recent travel history shows b/l Lower lobe infiltrates, switch to levaquin po, to f/u with pcp post-discharge. \par Home visit today, patient alert and oriented x 3, NAD. Patient was found too be medically stable, denies chest pain and states MAXWELL has improved. \par

## 2019-02-14 NOTE — HEALTH RISK ASSESSMENT
[0] : 2) Feeling down, depressed, or hopeless: Not at all (0) [With Family] : lives with family [Fully functional (bathing, dressing, toileting, transferring, walking, feeding)] : Fully functional (bathing, dressing, toileting, transferring, walking, feeding) [Fully functional (using the telephone, shopping, preparing meals, housekeeping, doing laundry, using] : Fully functional and needs no help or supervision to perform IADLs (using the telephone, shopping, preparing meals, housekeeping, doing laundry, using transportation, managing medications and managing finances) [[id number]] : 0 [de-identified] : daughter Chasity

## 2019-03-07 ENCOUNTER — CHART COPY (OUTPATIENT)
Age: 84
End: 2019-03-07

## 2019-03-14 ENCOUNTER — CHART COPY (OUTPATIENT)
Age: 84
End: 2019-03-14

## 2019-04-10 ENCOUNTER — CHART COPY (OUTPATIENT)
Age: 84
End: 2019-04-10

## 2019-04-12 ENCOUNTER — MOBILE ON CALL (OUTPATIENT)
Age: 84
End: 2019-04-12

## 2019-04-30 ENCOUNTER — APPOINTMENT (OUTPATIENT)
Dept: NEPHROLOGY | Facility: CLINIC | Age: 84
End: 2019-04-30
Payer: MEDICARE

## 2019-04-30 VITALS
DIASTOLIC BLOOD PRESSURE: 66 MMHG | OXYGEN SATURATION: 98 % | BODY MASS INDEX: 28.71 KG/M2 | HEIGHT: 63 IN | WEIGHT: 162.04 LBS | HEART RATE: 69 BPM | SYSTOLIC BLOOD PRESSURE: 135 MMHG

## 2019-04-30 PROCEDURE — 99204 OFFICE O/P NEW MOD 45 MIN: CPT

## 2019-04-30 RX ORDER — LEVOFLOXACIN 500 MG/1
500 TABLET, FILM COATED ORAL DAILY
Refills: 0 | Status: DISCONTINUED | COMMUNITY
Start: 2019-02-12 | End: 2019-04-30

## 2019-04-30 RX ORDER — LOSARTAN POTASSIUM AND HYDROCHLOROTHIAZIDE 25; 100 MG/1; MG/1
100-25 TABLET ORAL DAILY
Refills: 0 | Status: DISCONTINUED | COMMUNITY
Start: 2019-02-12 | End: 2019-04-30

## 2019-04-30 NOTE — ASSESSMENT
[FreeTextEntry1] : Ms. WALDROP is a 83 year old female with known HTN and potentially CKD stage 2 from HTN for years now presents with recently noted LOCO. Likely from contrast nephropathy and pre renal azotemia that was resolving following hydration, She also has been chronically on HCTZ-ARB for years and some of the elevated crt might be just functional pre renal vasoconstriction of being on ARB therapy. \par Reassured patient that overall, not a major concern\par D/w with her regarding ACEi-diuretic holidays when sick and admitted in hospital and volume depleted- and to hold agent during contrast studies. \par In addition, can check paraprotein workup and complement levels and urinalysis\par BP control is critical and at goal now\par \par f/u 3 months

## 2019-04-30 NOTE — PHYSICAL EXAM
[General Appearance - Alert] : alert [General Appearance - In No Acute Distress] : in no acute distress [Sclera] : the sclera and conjunctiva were normal [PERRL With Normal Accommodation] : pupils were equal in size, round, and reactive to light [Extraocular Movements] : extraocular movements were intact [Outer Ear] : the ears and nose were normal in appearance [Oropharynx] : the oropharynx was normal [Neck Appearance] : the appearance of the neck was normal [Neck Cervical Mass (___cm)] : no neck mass was observed [Jugular Venous Distention Increased] : there was no jugular-venous distention [Thyroid Diffuse Enlargement] : the thyroid was not enlarged [Thyroid Nodule] : there were no palpable thyroid nodules [Auscultation Breath Sounds / Voice Sounds] : lungs were clear to auscultation bilaterally [Heart Rate And Rhythm] : heart rate was normal and rhythm regular [Heart Sounds] : normal S1 and S2 [Heart Sounds Gallop] : no gallops [Murmurs] : no murmurs [Heart Sounds Pericardial Friction Rub] : no pericardial rub [Edema] : there was no peripheral edema [Bowel Sounds] : normal bowel sounds [Abdomen Soft] : soft [Abdomen Tenderness] : non-tender [] : no hepato-splenomegaly [Abdomen Mass (___ Cm)] : no abdominal mass palpated [No CVA Tenderness] : no ~M costovertebral angle tenderness [No Focal Deficits] : no focal deficits [Oriented To Time, Place, And Person] : oriented to person, place, and time

## 2019-05-01 ENCOUNTER — OUTPATIENT (OUTPATIENT)
Dept: OUTPATIENT SERVICES | Facility: HOSPITAL | Age: 84
LOS: 1 days | End: 2019-05-01
Payer: MEDICARE

## 2019-05-01 LAB
ALBUMIN SERPL ELPH-MCNC: 4.4 G/DL
ANION GAP SERPL CALC-SCNC: 11 MMOL/L
APPEARANCE: CLEAR
BACTERIA: NEGATIVE
BASOPHILS # BLD AUTO: 0.05 K/UL
BASOPHILS NFR BLD AUTO: 0.7 %
BILIRUBIN URINE: NEGATIVE
BLOOD URINE: NEGATIVE
BUN SERPL-MCNC: 34 MG/DL
C3 SERPL-MCNC: 131 MG/DL
C4 SERPL-MCNC: 28 MG/DL
CALCIUM SERPL-MCNC: 9.9 MG/DL
CALCIUM SERPL-MCNC: 9.9 MG/DL
CHLORIDE SERPL-SCNC: 108 MMOL/L
CO2 SERPL-SCNC: 24 MMOL/L
COLOR: NORMAL
CREAT SERPL-MCNC: 1.52 MG/DL
CREAT SPEC-SCNC: 78 MG/DL
CREAT/PROT UR: 0.1 RATIO
DEPRECATED KAPPA LC FREE/LAMBDA SER: 1.81 RATIO
EOSINOPHIL # BLD AUTO: 0.1 K/UL
EOSINOPHIL NFR BLD AUTO: 1.5 %
GLUCOSE QUALITATIVE U: NEGATIVE
GLUCOSE SERPL-MCNC: 137 MG/DL
HCT VFR BLD CALC: 34.1 %
HGB BLD-MCNC: 11 G/DL
HYALINE CASTS: 1 /LPF
IMM GRANULOCYTES NFR BLD AUTO: 0.4 %
KAPPA LC CSF-MCNC: 3.39 MG/DL
KAPPA LC SERPL-MCNC: 6.12 MG/DL
KETONES URINE: NEGATIVE
LEUKOCYTE ESTERASE URINE: NEGATIVE
LYMPHOCYTES # BLD AUTO: 2.13 K/UL
LYMPHOCYTES NFR BLD AUTO: 31.7 %
M PROTEIN SPEC IFE-MCNC: NORMAL
MAN DIFF?: NORMAL
MCHC RBC-ENTMCNC: 30.3 PG
MCHC RBC-ENTMCNC: 32.3 GM/DL
MCV RBC AUTO: 93.9 FL
MICROSCOPIC-UA: NORMAL
MONOCYTES # BLD AUTO: 0.46 K/UL
MONOCYTES NFR BLD AUTO: 6.8 %
NEUTROPHILS # BLD AUTO: 3.95 K/UL
NEUTROPHILS NFR BLD AUTO: 58.9 %
NITRITE URINE: NEGATIVE
PARATHYROID HORMONE INTACT: 109 PG/ML
PH URINE: 6.5
PHOSPHATE SERPL-MCNC: 3.5 MG/DL
PLATELET # BLD AUTO: 208 K/UL
POTASSIUM SERPL-SCNC: 4.5 MMOL/L
PROT UR-MCNC: 5 MG/DL
PROTEIN URINE: NEGATIVE
RBC # BLD: 3.63 M/UL
RBC # FLD: 14.4 %
RED BLOOD CELLS URINE: 1 /HPF
SODIUM SERPL-SCNC: 143 MMOL/L
SPECIFIC GRAVITY URINE: 1.02
SQUAMOUS EPITHELIAL CELLS: 1 /HPF
UROBILINOGEN URINE: NORMAL
WBC # FLD AUTO: 6.72 K/UL
WHITE BLOOD CELLS URINE: 1 /HPF

## 2019-05-01 PROCEDURE — G9001: CPT

## 2019-06-07 DIAGNOSIS — Z71.89 OTHER SPECIFIED COUNSELING: ICD-10-CM

## 2019-08-21 ENCOUNTER — APPOINTMENT (OUTPATIENT)
Dept: NEPHROLOGY | Facility: CLINIC | Age: 84
End: 2019-08-21
Payer: MEDICARE

## 2019-08-21 VITALS
HEART RATE: 69 BPM | OXYGEN SATURATION: 96 % | HEIGHT: 63 IN | SYSTOLIC BLOOD PRESSURE: 161 MMHG | DIASTOLIC BLOOD PRESSURE: 68 MMHG | WEIGHT: 160.93 LBS | BODY MASS INDEX: 28.52 KG/M2

## 2019-08-21 VITALS — DIASTOLIC BLOOD PRESSURE: 60 MMHG | SYSTOLIC BLOOD PRESSURE: 140 MMHG

## 2019-08-21 DIAGNOSIS — R01.1 CARDIAC MURMUR, UNSPECIFIED: ICD-10-CM

## 2019-08-21 DIAGNOSIS — Z82.49 FAMILY HISTORY OF ISCHEMIC HEART DISEASE AND OTHER DISEASES OF THE CIRCULATORY SYSTEM: ICD-10-CM

## 2019-08-21 PROCEDURE — 99214 OFFICE O/P EST MOD 30 MIN: CPT | Mod: GC

## 2019-08-21 RX ORDER — LISINOPRIL AND HYDROCHLOROTHIAZIDE TABLETS 20; 12.5 MG/1; MG/1
20-12.5 TABLET ORAL DAILY
Refills: 0 | Status: DISCONTINUED | COMMUNITY
Start: 2019-04-30 | End: 2019-08-21

## 2019-08-21 NOTE — PHYSICAL EXAM
[General Appearance - Alert] : alert [General Appearance - In No Acute Distress] : in no acute distress [Sclera] : the sclera and conjunctiva were normal [PERRL With Normal Accommodation] : pupils were equal in size, round, and reactive to light [Extraocular Movements] : extraocular movements were intact [Outer Ear] : the ears and nose were normal in appearance [Oropharynx] : the oropharynx was normal [Neck Appearance] : the appearance of the neck was normal [Neck Cervical Mass (___cm)] : no neck mass was observed [Jugular Venous Distention Increased] : there was no jugular-venous distention [Thyroid Diffuse Enlargement] : the thyroid was not enlarged [Thyroid Nodule] : there were no palpable thyroid nodules [Auscultation Breath Sounds / Voice Sounds] : lungs were clear to auscultation bilaterally [Heart Rate And Rhythm] : heart rate was normal and rhythm regular [Heart Sounds] : normal S1 and S2 [Heart Sounds Gallop] : no gallops [Heart Sounds Pericardial Friction Rub] : no pericardial rub [Edema] : there was no peripheral edema [Bowel Sounds] : normal bowel sounds [Abdomen Soft] : soft [Abdomen Tenderness] : non-tender [Abdomen Mass (___ Cm)] : no abdominal mass palpated [No CVA Tenderness] : no ~M costovertebral angle tenderness [No Focal Deficits] : no focal deficits [Oriented To Time, Place, And Person] : oriented to person, place, and time [Systolic grade ___/6] : A grade [unfilled]/6 systolic murmur was heard. [] : no rash

## 2019-08-21 NOTE — REVIEW OF SYSTEMS
[Negative] : Integumentary [Confused] : no confusion [Dizziness] : no dizziness [Fainting] : no fainting [Anxiety] : no anxiety [Depression] : no depression [Muscle Weakness] : no muscle weakness [Swollen Glands] : no swollen glands [Feelings Of Weakness] : no feelings of weakness [Swollen Glands In The Neck] : no swollen glands in the neck

## 2019-08-21 NOTE — END OF VISIT
[] : Fellow [FreeTextEntry3] : Overall, CKD is stable and even if HCTZ/ACEI playing some role in elevated crt, it is ok to continue as overall long term protection for slight rise in crt. Would prefer ACEI at least in her case as hydralazine is not as renal protective. Will d/w with Cards\par \par fu 3 months

## 2019-08-21 NOTE — ASSESSMENT
[FreeTextEntry1] : Ms. WALDROP is a 83 year old female with known HTN and potentially CKD stage 3 from HTN for years had episode of LOCO few months ago, likely from contrast nephropathy and pre renal azotemia that was resolving following hydration. Scr was done last visit scr was at 1.5 on 05/01/19. \par \par \par CKD Stage 3\par - Pt most likely with CKD, will repeat renal panel today, pt most likely will benefit to resume lisinopril. Educated to about low salt diet, glucose and bp control. \par \par HTN\par - BP initially elevated repeated 140, still above goal, will repeat renal panel if scr stable will resume lisinopril only w/o hctz. \par \par RTC in 3 months or sooner if needed. \par \par \par

## 2019-08-22 LAB
ALBUMIN SERPL ELPH-MCNC: 4.4 G/DL
ANION GAP SERPL CALC-SCNC: 13 MMOL/L
BUN SERPL-MCNC: 27 MG/DL
CALCIUM SERPL-MCNC: 9.8 MG/DL
CHLORIDE SERPL-SCNC: 110 MMOL/L
CO2 SERPL-SCNC: 22 MMOL/L
CREAT SERPL-MCNC: 1.52 MG/DL
GLUCOSE SERPL-MCNC: 107 MG/DL
PHOSPHATE SERPL-MCNC: 3.7 MG/DL
POTASSIUM SERPL-SCNC: 4.4 MMOL/L
SODIUM SERPL-SCNC: 145 MMOL/L

## 2019-08-23 DIAGNOSIS — N17.9 ACUTE KIDNEY FAILURE, UNSPECIFIED: ICD-10-CM

## 2019-09-17 ENCOUNTER — APPOINTMENT (OUTPATIENT)
Dept: DERMATOLOGY | Facility: CLINIC | Age: 84
End: 2019-09-17
Payer: MEDICARE

## 2019-09-17 VITALS
DIASTOLIC BLOOD PRESSURE: 84 MMHG | HEIGHT: 63 IN | BODY MASS INDEX: 28.35 KG/M2 | SYSTOLIC BLOOD PRESSURE: 130 MMHG | WEIGHT: 160 LBS

## 2019-09-17 DIAGNOSIS — Z78.9 OTHER SPECIFIED HEALTH STATUS: ICD-10-CM

## 2019-09-17 DIAGNOSIS — L82.1 OTHER SEBORRHEIC KERATOSIS: ICD-10-CM

## 2019-09-17 DIAGNOSIS — L21.9 SEBORRHEIC DERMATITIS, UNSPECIFIED: ICD-10-CM

## 2019-09-17 DIAGNOSIS — Z91.89 OTHER SPECIFIED PERSONAL RISK FACTORS, NOT ELSEWHERE CLASSIFIED: ICD-10-CM

## 2019-09-17 DIAGNOSIS — B07.8 OTHER VIRAL WARTS: ICD-10-CM

## 2019-09-17 PROCEDURE — 99203 OFFICE O/P NEW LOW 30 MIN: CPT | Mod: GC,25

## 2019-09-17 PROCEDURE — 17110 DESTRUCTION B9 LES UP TO 14: CPT | Mod: GC

## 2019-09-17 RX ORDER — FLUOCINONIDE 0.5 MG/ML
0.05 SOLUTION TOPICAL
Qty: 1 | Refills: 3 | Status: ACTIVE | COMMUNITY
Start: 2019-09-17 | End: 1900-01-01

## 2019-09-17 RX ORDER — KETOCONAZOLE 20.5 MG/ML
2 SHAMPOO, SUSPENSION TOPICAL
Qty: 1 | Refills: 11 | Status: ACTIVE | COMMUNITY
Start: 2019-09-17 | End: 1900-01-01

## 2019-09-18 LAB
ALBUMIN SERPL ELPH-MCNC: 4.5 G/DL
ANION GAP SERPL CALC-SCNC: 9 MMOL/L
BUN SERPL-MCNC: 31 MG/DL
CALCIUM SERPL-MCNC: 10 MG/DL
CHLORIDE SERPL-SCNC: 110 MMOL/L
CO2 SERPL-SCNC: 24 MMOL/L
CREAT SERPL-MCNC: 1.55 MG/DL
GLUCOSE SERPL-MCNC: 95 MG/DL
PHOSPHATE SERPL-MCNC: 4 MG/DL
POTASSIUM SERPL-SCNC: 4.6 MMOL/L
SODIUM SERPL-SCNC: 143 MMOL/L

## 2019-11-13 ENCOUNTER — APPOINTMENT (OUTPATIENT)
Dept: NEPHROLOGY | Facility: CLINIC | Age: 84
End: 2019-11-13

## 2020-10-08 ENCOUNTER — APPOINTMENT (OUTPATIENT)
Dept: NEPHROLOGY | Facility: CLINIC | Age: 85
End: 2020-10-08
Payer: MEDICARE

## 2020-10-08 VITALS
HEART RATE: 61 BPM | WEIGHT: 158.73 LBS | SYSTOLIC BLOOD PRESSURE: 168 MMHG | BODY MASS INDEX: 28.12 KG/M2 | DIASTOLIC BLOOD PRESSURE: 68 MMHG | OXYGEN SATURATION: 98 %

## 2020-10-08 DIAGNOSIS — Z23 ENCOUNTER FOR IMMUNIZATION: ICD-10-CM

## 2020-10-08 PROCEDURE — 99214 OFFICE O/P EST MOD 30 MIN: CPT | Mod: 25,GC

## 2020-10-08 PROCEDURE — 90686 IIV4 VACC NO PRSV 0.5 ML IM: CPT

## 2020-10-08 PROCEDURE — G0008: CPT

## 2020-10-08 NOTE — ASSESSMENT
[FreeTextEntry1] : Ms. WALDROP is a 85 year old female with known HTN and potentially CKD stage 3 from HTN for years had episode of LOCO few months ago, likely from contrast nephropathy and pre renal azotemia that was resolving following hydration. Scr was done last visit scr was at 1.5 on 05/01/19. \par \par \par CKD Stage 3\par - Pt with CKD. Patient will send labs from cardiologist office from 2-3 months ago to assess kidney function.\par \par HTN\par - BP initially elevated repeated 168, still above goal. Told to get BP cuff for home to assess BP outside of office. Will make adjustments if needed based on home readings if needed.\par \par RTC in 3 months or sooner if needed.

## 2020-10-08 NOTE — PHYSICAL EXAM
[General Appearance - Alert] : alert [General Appearance - In No Acute Distress] : in no acute distress [Sclera] : the sclera and conjunctiva were normal [PERRL With Normal Accommodation] : pupils were equal in size, round, and reactive to light [Extraocular Movements] : extraocular movements were intact [Outer Ear] : the ears and nose were normal in appearance [Oropharynx] : the oropharynx was normal [Neck Appearance] : the appearance of the neck was normal [Neck Cervical Mass (___cm)] : no neck mass was observed [Jugular Venous Distention Increased] : there was no jugular-venous distention [Thyroid Diffuse Enlargement] : the thyroid was not enlarged [Thyroid Nodule] : there were no palpable thyroid nodules [Auscultation Breath Sounds / Voice Sounds] : lungs were clear to auscultation bilaterally [Heart Rate And Rhythm] : heart rate was normal and rhythm regular [Heart Sounds] : normal S1 and S2 [Heart Sounds Gallop] : no gallops [Heart Sounds Pericardial Friction Rub] : no pericardial rub [Systolic grade ___/6] : A grade [unfilled]/6 systolic murmur was heard. [Bowel Sounds] : normal bowel sounds [Abdomen Soft] : soft [Abdomen Tenderness] : non-tender [Abdomen Mass (___ Cm)] : no abdominal mass palpated [No CVA Tenderness] : no ~M costovertebral angle tenderness [] : no rash [No Focal Deficits] : no focal deficits [Oriented To Time, Place, And Person] : oriented to person, place, and time [FreeTextEntry1] : 2+ edema of LE.

## 2020-10-08 NOTE — REVIEW OF SYSTEMS
[Negative] : Integumentary [Confused] : no confusion [Dizziness] : no dizziness [Fainting] : no fainting [Anxiety] : no anxiety [Depression] : no depression [Muscle Weakness] : no muscle weakness [Feelings Of Weakness] : no feelings of weakness [Swollen Glands] : no swollen glands [Swollen Glands In The Neck] : no swollen glands in the neck

## 2020-10-08 NOTE — HISTORY OF PRESENT ILLNESS
[FreeTextEntry1] : Ms. WALDROP is a 85 year old female with  PMH HTN, HLD, and migraines here for recently noted LOCO,  her baseline crt in 1.2-1.3 range was recently seen in [facility] ER on 2/9/19 with sharp right lower pleuritic chest pain and subjective fevers with chills for the last 2-3 days. CT angio obtained(with contrast on 2/9) to rule out PE but it showed bl Lower lobe infiltrates, switch to levaquin po, to f/u with pcp post-discharge.  Her crt on 2/9 was 1.3mg/dl and then janelle during admission to 1.7 and came down to 1.5 on dc with hydration. Elevation in scr was thought to be from being on ace/hctz during acute event. \par Today presents to clinic for follow up. Has been doing well in the last year without major complaints. Denies cp/sob/le edema, PND, n/v/abodminal pain.  \par

## 2021-04-20 ENCOUNTER — APPOINTMENT (OUTPATIENT)
Dept: NEPHROLOGY | Facility: CLINIC | Age: 86
End: 2021-04-20
Payer: MEDICARE

## 2021-04-20 VITALS
BODY MASS INDEX: 28.9 KG/M2 | HEART RATE: 74 BPM | SYSTOLIC BLOOD PRESSURE: 150 MMHG | OXYGEN SATURATION: 96 % | TEMPERATURE: 98.4 F | DIASTOLIC BLOOD PRESSURE: 67 MMHG | WEIGHT: 163.14 LBS

## 2021-04-20 PROCEDURE — 99214 OFFICE O/P EST MOD 30 MIN: CPT | Mod: GC

## 2021-04-20 NOTE — ASSESSMENT
[FreeTextEntry1] : Ms. WALDROP is a 85 year old female with known HTN and potentially CKD stage 3 from HTN for years had episode of LOCO in 2019, likely from contrast nephropathy and pre renal azotemia that was resolving following hydration. Last Scr was done 1.5 on 05/01/19. \par \par \par #CKD Stage 3\par Pt with stable CKD, no acute events.\par last Cr at 1.5\par will need repeated labs today (renal panel, UA, prot/cr ratio, CBC)\par avoid NSAIDS, nephrotoxic drugs, IV contrast\par \par HTN\par sBP at 150 today, \par low salt diet \par taking amlodipine 5mg po day and lisinopril 10\par hydralazine 50 tid, bystolic 5mg po day \par \par COVID vaccine: Got J and J vaccine 2 weeks ago. Checking AB today\par No signs of headaches, SOB or leg pain, abdominal pain suggesting clotting\par \par RTC in 3 months or sooner if needed.

## 2021-04-20 NOTE — HISTORY OF PRESENT ILLNESS
[FreeTextEntry1] : Ms. WALDROP is a 85 year old female with  PMH HTN, HLD, and migraines here for recently noted LOCO,  her baseline crt in 1.2-1.3 range was recently seen in [facility] ER on 2/9/19 with sharp right lower pleuritic chest pain and subjective fevers with chills for the last 2-3 days. CT angio obtained(with contrast on 2/9) to rule out PE but it showed bl Lower lobe infiltrates, switch to levaquin po, to f/u with pcp post-discharge.  Her crt on 2/9 was 1.3mg/dl and then janelle during admission to 1.7 and came down to 1.5 on dc with hydration. Elevation in scr was thought to be from being on ace/hctz during acute event. \par \par last visit in 10/2020, she has been doing well, she got COVID19 vaccine (J&J) 2 weeks ago. Currently no issues. She is taking ASA.  Denies cp/sob/le edema, PND, n/v/abdominal pain.  \par

## 2021-04-20 NOTE — PHYSICAL EXAM
[General Appearance - Alert] : alert [General Appearance - In No Acute Distress] : in no acute distress [Sclera] : the sclera and conjunctiva were normal [PERRL With Normal Accommodation] : pupils were equal in size, round, and reactive to light [Extraocular Movements] : extraocular movements were intact [Outer Ear] : the ears and nose were normal in appearance [Oropharynx] : the oropharynx was normal [Neck Appearance] : the appearance of the neck was normal [Neck Cervical Mass (___cm)] : no neck mass was observed [Jugular Venous Distention Increased] : there was no jugular-venous distention [Thyroid Diffuse Enlargement] : the thyroid was not enlarged [Thyroid Nodule] : there were no palpable thyroid nodules [Auscultation Breath Sounds / Voice Sounds] : lungs were clear to auscultation bilaterally [Heart Rate And Rhythm] : heart rate was normal and rhythm regular [Heart Sounds] : normal S1 and S2 [Heart Sounds Gallop] : no gallops [Heart Sounds Pericardial Friction Rub] : no pericardial rub [Systolic grade ___/6] : A grade [unfilled]/6 systolic murmur was heard. [Bowel Sounds] : normal bowel sounds [Abdomen Tenderness] : non-tender [Abdomen Soft] : soft [Abdomen Mass (___ Cm)] : no abdominal mass palpated [No CVA Tenderness] : no ~M costovertebral angle tenderness [] : no rash [No Focal Deficits] : no focal deficits [Oriented To Time, Place, And Person] : oriented to person, place, and time [FreeTextEntry1] : 2+ edema of LE.

## 2021-04-21 LAB
ALBUMIN SERPL ELPH-MCNC: 4.4 G/DL
ANION GAP SERPL CALC-SCNC: 12 MMOL/L
APPEARANCE: ABNORMAL
BACTERIA: NEGATIVE
BASOPHILS # BLD AUTO: 0.05 K/UL
BASOPHILS NFR BLD AUTO: 0.6 %
BILIRUBIN URINE: NEGATIVE
BLOOD URINE: NEGATIVE
BUN SERPL-MCNC: 29 MG/DL
CALCIUM SERPL-MCNC: 10.3 MG/DL
CHLORIDE SERPL-SCNC: 109 MMOL/L
CO2 SERPL-SCNC: 20 MMOL/L
COLOR: NORMAL
COVID-19 SPIKE DOMAIN ANTIBODY INTERPRETATION: NEGATIVE
CREAT SERPL-MCNC: 1.35 MG/DL
CREAT SPEC-SCNC: 83 MG/DL
CREAT/PROT UR: 0.1 RATIO
EOSINOPHIL # BLD AUTO: 0.22 K/UL
EOSINOPHIL NFR BLD AUTO: 2.7 %
GLUCOSE QUALITATIVE U: NEGATIVE
GLUCOSE SERPL-MCNC: 106 MG/DL
HCT VFR BLD CALC: 36.2 %
HGB BLD-MCNC: 11.3 G/DL
HYALINE CASTS: 0 /LPF
IMM GRANULOCYTES NFR BLD AUTO: 0.4 %
KETONES URINE: NEGATIVE
LEUKOCYTE ESTERASE URINE: ABNORMAL
LYMPHOCYTES # BLD AUTO: 2.81 K/UL
LYMPHOCYTES NFR BLD AUTO: 33.9 %
MAN DIFF?: NORMAL
MCHC RBC-ENTMCNC: 28.6 PG
MCHC RBC-ENTMCNC: 31.2 GM/DL
MCV RBC AUTO: 91.6 FL
MICROSCOPIC-UA: NORMAL
MONOCYTES # BLD AUTO: 0.7 K/UL
MONOCYTES NFR BLD AUTO: 8.4 %
NEUTROPHILS # BLD AUTO: 4.48 K/UL
NEUTROPHILS NFR BLD AUTO: 54 %
NITRITE URINE: NEGATIVE
PH URINE: 6
PHOSPHATE SERPL-MCNC: 3.7 MG/DL
PLATELET # BLD AUTO: 218 K/UL
POTASSIUM SERPL-SCNC: 5.2 MMOL/L
PROT UR-MCNC: 10 MG/DL
PROTEIN URINE: NORMAL
RBC # BLD: 3.95 M/UL
RBC # FLD: 14.2 %
RED BLOOD CELLS URINE: 11 /HPF
SARS-COV-2 AB SERPL IA-ACNC: 0.4 U/ML
SODIUM SERPL-SCNC: 141 MMOL/L
SPECIFIC GRAVITY URINE: 1.01
SQUAMOUS EPITHELIAL CELLS: 5 /HPF
UROBILINOGEN URINE: NORMAL
WBC # FLD AUTO: 8.29 K/UL
WHITE BLOOD CELLS URINE: 4 /HPF

## 2021-05-04 NOTE — HISTORY OF PRESENT ILLNESS
[FreeTextEntry1] : Ms. WALDROP is a 83 year old female with  PMH HTN, HLD, and migraines here for recently noted LOCO,  her baseline crt in 1.2-1.3 range was recently seen in [facility] ER on 2/9/19 with sharp right lower pleuritic chest pain and subjective fevers with chills for the last 2-3 days. CT angio obtained(with contrast on 2/9) to rule out PE but it showed bl Lower lobe infiltrates, switch to levaquin po, to f/u with pcp post-discharge.  Her crt on 2/9 was 1.3mg/dl and then janelle during admission to 1.7 and came down to 1.5 on dc with hydration. Elevation in scr was thought to be from being on ace/hctz during acute event. \par Today presents to clinic for follow up  since last visit was evaluated by cardiology, lisinopril/hctz was held and hydralazine increased to 100 mg TID, denies cp/sob/le edema, PND, n/v/abodminal pain.  \par  124

## 2021-10-19 ENCOUNTER — APPOINTMENT (OUTPATIENT)
Dept: NEPHROLOGY | Facility: CLINIC | Age: 86
End: 2021-10-19
Payer: MEDICARE

## 2021-10-19 VITALS
HEART RATE: 65 BPM | HEIGHT: 63 IN | SYSTOLIC BLOOD PRESSURE: 158 MMHG | TEMPERATURE: 98.1 F | DIASTOLIC BLOOD PRESSURE: 80 MMHG | WEIGHT: 165.34 LBS | OXYGEN SATURATION: 99 % | BODY MASS INDEX: 29.3 KG/M2

## 2021-10-19 PROCEDURE — 99213 OFFICE O/P EST LOW 20 MIN: CPT | Mod: GC

## 2021-10-19 RX ORDER — SODIUM BICARBONATE 650 MG/1
650 TABLET ORAL 3 TIMES DAILY
Qty: 90 | Refills: 1 | Status: ACTIVE | COMMUNITY
Start: 2021-10-19 | End: 1900-01-01

## 2021-10-19 RX ORDER — AMLODIPINE BESYLATE 5 MG/1
5 TABLET ORAL DAILY
Qty: 90 | Refills: 0 | Status: DISCONTINUED | COMMUNITY
Start: 2020-10-08 | End: 2021-10-19

## 2021-10-19 RX ORDER — AMLODIPINE BESYLATE 10 MG/1
10 TABLET ORAL
Qty: 30 | Refills: 3 | Status: ACTIVE | COMMUNITY
Start: 2021-10-19 | End: 1900-01-01

## 2021-10-20 NOTE — HISTORY OF PRESENT ILLNESS
[FreeTextEntry1] : Ms. WALDROP is a 86 year old female with PMH HTN, HLD, and migraines who initially presented for LOCO, her baseline crt in 1.2-1.3 range. He was admitted to [facility] ER on 2/9/19 with sharp right lower pleuritic chest pain and subjective fevers with chills for the last 2-3 days.  Her crt on 2/9/19 was 1.3mg/dl and then janelle during admission to 1.7 and came down to 1.5 on dc with hydration. Elevation in scr was thought to be from being on ace/hctz during acute event. Last visit was in 4/2021. she got COVID19 vaccine (J&J). Now seems to be CKD stage 3.\par \par Since last visit she is doing well. Currently no issues. Denies chest pain, SOB, LE edema, PND, nausea, vomiting, abdominal pain.

## 2021-10-20 NOTE — END OF VISIT
[] : Fellow [FreeTextEntry3] : Will plan to increase CCB for now and may need more increasing with ACEI\par Check renin william levels\par f.u in 2 months

## 2021-10-20 NOTE — PHYSICAL EXAM
[General Appearance - Alert] : alert [General Appearance - In No Acute Distress] : in no acute distress [Sclera] : the sclera and conjunctiva were normal [PERRL With Normal Accommodation] : pupils were equal in size, round, and reactive to light [Extraocular Movements] : extraocular movements were intact [Outer Ear] : the ears and nose were normal in appearance [Oropharynx] : the oropharynx was normal [Neck Appearance] : the appearance of the neck was normal [Neck Cervical Mass (___cm)] : no neck mass was observed [Jugular Venous Distention Increased] : there was no jugular-venous distention [Thyroid Diffuse Enlargement] : the thyroid was not enlarged [Thyroid Nodule] : there were no palpable thyroid nodules [Auscultation Breath Sounds / Voice Sounds] : lungs were clear to auscultation bilaterally [Heart Rate And Rhythm] : heart rate was normal and rhythm regular [Heart Sounds] : normal S1 and S2 [Heart Sounds Gallop] : no gallops [Heart Sounds Pericardial Friction Rub] : no pericardial rub [Systolic grade ___/6] : A grade [unfilled]/6 systolic murmur was heard. [Abdomen Soft] : soft [Bowel Sounds] : normal bowel sounds [Abdomen Tenderness] : non-tender [Abdomen Mass (___ Cm)] : no abdominal mass palpated [No CVA Tenderness] : no ~M costovertebral angle tenderness [Musculoskeletal - Swelling] : no joint swelling seen [Abnormal Walk] : normal gait [] : no rash [No Focal Deficits] : no focal deficits [Oriented To Time, Place, And Person] : oriented to person, place, and time [FreeTextEntry1] : 2+ edema of LE.

## 2021-10-20 NOTE — ASSESSMENT
[FreeTextEntry1] : Ms. WALDROP is a 86 year old female with known HTN and CKD stage 3. \par \par #CKD Stage 3\par Pt with stable CKD, no acute events.\par last Cr at 1.5>> 1.3 (4/2021) >> 1.3 (10/2021)\par will need repeated labs today (renal panel, UA, prot/cr ratio, CBC)\par avoid NSAIDS, nephrotoxic drugs, IV contrast\par low bicarb at 20 - will send Na bicarb tabs \par \par HTN\par BP uncontrolled\par low salt diet \par Will increase amlodipine to 10mg po day \par c/w Lisinopril 5mg BID for now\par hydralazine 50 tid, bystolic 5mg po day \par \par COVID vaccine: Got J and J vaccine.  \par \par \par RTC in 3 months or sooner if needed.

## 2022-03-01 ENCOUNTER — APPOINTMENT (OUTPATIENT)
Dept: NEPHROLOGY | Facility: CLINIC | Age: 87
End: 2022-03-01

## 2022-05-06 ENCOUNTER — APPOINTMENT (OUTPATIENT)
Dept: ORTHOPEDIC SURGERY | Facility: CLINIC | Age: 87
End: 2022-05-06
Payer: MEDICARE

## 2022-05-06 VITALS — HEIGHT: 63.5 IN | BODY MASS INDEX: 28.87 KG/M2 | WEIGHT: 165 LBS

## 2022-05-06 DIAGNOSIS — S82.142D DISPLACED BICONDYLAR FRACTURE OF LEFT TIBIA, SUBSEQUENT ENCOUNTER FOR CLOSED FRACTURE WITH ROUTINE HEALING: ICD-10-CM

## 2022-05-06 DIAGNOSIS — M17.12 UNILATERAL PRIMARY OSTEOARTHRITIS, LEFT KNEE: ICD-10-CM

## 2022-05-06 DIAGNOSIS — S83.242D OTHER TEAR OF MEDIAL MENISCUS, CURRENT INJURY, LEFT KNEE, SUBSEQUENT ENCOUNTER: ICD-10-CM

## 2022-05-06 PROCEDURE — 73560 X-RAY EXAM OF KNEE 1 OR 2: CPT | Mod: LT

## 2022-05-06 PROCEDURE — 99213 OFFICE O/P EST LOW 20 MIN: CPT

## 2022-05-06 NOTE — HISTORY OF PRESENT ILLNESS
[5] : 5 [2] : 2 [Dull/Aching] : dull/aching [Localized] : localized [Retired] : Work status: retired [de-identified] :  Patient is an 85yo female who presents for evaluation of left knee pain. She tripped in her home about 6 weeks ago. Since then she has needed a cane for ambulation\par Here for f/u left knee feels 75% better walking better . [] : Post Surgical Visit: no [FreeTextEntry1] : left knee [de-identified] : physical therapy, knee brace

## 2022-05-06 NOTE — PHYSICAL EXAM
[Left] : left knee [NL (0)] : extension 0 degrees [5___] : hamstring 5[unfilled]/5 [Negative] : negative Margarita's [] : ambulation with cane [The fracture is in acceptable alignment. There is progression in healing seen] : The fracture is in acceptable alignment. There is progression in healing seen [TWNoteComboBox7] : flexion 110 degrees

## 2022-05-06 NOTE — DISCUSSION/SUMMARY
[de-identified] : General Dx Discussion\par The patient was advised of the diagnosis. The natural history of the pathology was explained in full to the patient in layman's terms. All questions were answered. The risks and benefits of surgical and non-surgical treatment alternatives were explained in full to the patient.\par \par will cont PT

## 2022-06-23 NOTE — HISTORY OF PRESENT ILLNESS
[FreeTextEntry1] : Ms. WALDROP is a 83 year old female with  PMH HTN, HLD, and migraines here for recently noted LOCO. She was doing well with no know CKD to her knowledge and baseline crt in 1.2-1.3 range was recently seen in [facility] ER on 2/9/19 with sharp right lower pleuritic chest pain and subjective fevers with chills for the last 2-3 days. CT angio obtained(with contrast on 2/9) to rule out PE but it showed bl Lower lobe infiltrates, switch to levaquin po, to f/u with pcp post-discharge.  Her crt on 2/9 was 1.3mg/dl and then janelle during admission to 1.7 and came down to 1.5 on dc with hydration. She was on losartan-HCTZ and was continued on that during the admission. She has had HTN for years and on the listed meds. She was recently post dc changed from losartan-HCTZ to lisinopril-HCTZ due to recall issues. She has no DMII, no chronic NSAID use and no chronic PPI use. No herbal meds and no FH of kidney disease or dialysis. She used to work as a  and she is here with her grand daughter. She had a crt of 1.2mg/dl in our records in 2015. She was never told that she has hematuria or proteinuria.  During this recent admission in hospl, she had an abd sonogram and her kidney sizes were in 9.5-10 range bl with no stones or hydronephrosis. \par  4

## 2022-07-01 ENCOUNTER — APPOINTMENT (OUTPATIENT)
Dept: ORTHOPEDIC SURGERY | Facility: CLINIC | Age: 87
End: 2022-07-01

## 2022-10-28 ENCOUNTER — INPATIENT (INPATIENT)
Facility: HOSPITAL | Age: 87
LOS: 3 days | Discharge: HOME | End: 2022-11-01
Attending: INTERNAL MEDICINE | Admitting: INTERNAL MEDICINE

## 2022-10-28 VITALS
SYSTOLIC BLOOD PRESSURE: 209 MMHG | WEIGHT: 156.09 LBS | HEIGHT: 63 IN | RESPIRATION RATE: 18 BRPM | DIASTOLIC BLOOD PRESSURE: 70 MMHG | TEMPERATURE: 99 F | OXYGEN SATURATION: 98 % | HEART RATE: 78 BPM

## 2022-10-28 LAB
ALBUMIN SERPL ELPH-MCNC: 4.2 G/DL — SIGNIFICANT CHANGE UP (ref 3.3–5)
ALP SERPL-CCNC: 57 U/L — SIGNIFICANT CHANGE UP (ref 40–120)
ALT FLD-CCNC: 21 U/L — SIGNIFICANT CHANGE UP (ref 12–78)
ANION GAP SERPL CALC-SCNC: 8 MMOL/L — SIGNIFICANT CHANGE UP (ref 5–17)
AST SERPL-CCNC: 12 U/L — LOW (ref 15–37)
BASOPHILS # BLD AUTO: 0.05 K/UL — SIGNIFICANT CHANGE UP (ref 0–0.2)
BASOPHILS NFR BLD AUTO: 0.5 % — SIGNIFICANT CHANGE UP (ref 0–2)
BILIRUB SERPL-MCNC: 0.3 MG/DL — SIGNIFICANT CHANGE UP (ref 0.2–1.2)
BUN SERPL-MCNC: 29 MG/DL — HIGH (ref 7–23)
CALCIUM SERPL-MCNC: 9.9 MG/DL — SIGNIFICANT CHANGE UP (ref 8.5–10.1)
CHLORIDE SERPL-SCNC: 111 MMOL/L — HIGH (ref 96–108)
CK SERPL-CCNC: 90 U/L — SIGNIFICANT CHANGE UP (ref 26–192)
CO2 SERPL-SCNC: 24 MMOL/L — SIGNIFICANT CHANGE UP (ref 22–31)
CREAT SERPL-MCNC: 1.49 MG/DL — HIGH (ref 0.5–1.3)
EGFR: 34 ML/MIN/1.73M2 — LOW
EOSINOPHIL # BLD AUTO: 0.07 K/UL — SIGNIFICANT CHANGE UP (ref 0–0.5)
EOSINOPHIL NFR BLD AUTO: 0.7 % — SIGNIFICANT CHANGE UP (ref 0–6)
GLUCOSE SERPL-MCNC: 114 MG/DL — HIGH (ref 70–99)
HCT VFR BLD CALC: 36.9 % — SIGNIFICANT CHANGE UP (ref 34.5–45)
HGB BLD-MCNC: 12.2 G/DL — SIGNIFICANT CHANGE UP (ref 11.5–15.5)
IMM GRANULOCYTES NFR BLD AUTO: 0.4 % — SIGNIFICANT CHANGE UP (ref 0–0.9)
INR BLD: 0.91 RATIO — SIGNIFICANT CHANGE UP (ref 0.88–1.16)
LYMPHOCYTES # BLD AUTO: 2.6 K/UL — SIGNIFICANT CHANGE UP (ref 1–3.3)
LYMPHOCYTES # BLD AUTO: 26.4 % — SIGNIFICANT CHANGE UP (ref 13–44)
MCHC RBC-ENTMCNC: 29.9 PG — SIGNIFICANT CHANGE UP (ref 27–34)
MCHC RBC-ENTMCNC: 33.1 G/DL — SIGNIFICANT CHANGE UP (ref 32–36)
MCV RBC AUTO: 90.4 FL — SIGNIFICANT CHANGE UP (ref 80–100)
MONOCYTES # BLD AUTO: 0.63 K/UL — SIGNIFICANT CHANGE UP (ref 0–0.9)
MONOCYTES NFR BLD AUTO: 6.4 % — SIGNIFICANT CHANGE UP (ref 2–14)
NEUTROPHILS # BLD AUTO: 6.44 K/UL — SIGNIFICANT CHANGE UP (ref 1.8–7.4)
NEUTROPHILS NFR BLD AUTO: 65.6 % — SIGNIFICANT CHANGE UP (ref 43–77)
NRBC # BLD: 0 /100 WBCS — SIGNIFICANT CHANGE UP (ref 0–0)
PLATELET # BLD AUTO: 208 K/UL — SIGNIFICANT CHANGE UP (ref 150–400)
POTASSIUM SERPL-MCNC: 4.5 MMOL/L — SIGNIFICANT CHANGE UP (ref 3.5–5.3)
POTASSIUM SERPL-SCNC: 4.5 MMOL/L — SIGNIFICANT CHANGE UP (ref 3.5–5.3)
PROT SERPL-MCNC: 8.3 GM/DL — SIGNIFICANT CHANGE UP (ref 6–8.3)
PROTHROM AB SERPL-ACNC: 10.8 SEC — SIGNIFICANT CHANGE UP (ref 10.5–13.4)
RBC # BLD: 4.08 M/UL — SIGNIFICANT CHANGE UP (ref 3.8–5.2)
RBC # FLD: 14.1 % — SIGNIFICANT CHANGE UP (ref 10.3–14.5)
SODIUM SERPL-SCNC: 143 MMOL/L — SIGNIFICANT CHANGE UP (ref 135–145)
TROPONIN I, HIGH SENSITIVITY RESULT: 154.3 NG/L — HIGH
WBC # BLD: 9.83 K/UL — SIGNIFICANT CHANGE UP (ref 3.8–10.5)
WBC # FLD AUTO: 9.83 K/UL — SIGNIFICANT CHANGE UP (ref 3.8–10.5)

## 2022-10-28 PROCEDURE — 70450 CT HEAD/BRAIN W/O DYE: CPT | Mod: 26,MA

## 2022-10-28 PROCEDURE — 93010 ELECTROCARDIOGRAM REPORT: CPT

## 2022-10-28 PROCEDURE — 99285 EMERGENCY DEPT VISIT HI MDM: CPT

## 2022-10-28 RX ORDER — HEPARIN SODIUM 5000 [USP'U]/ML
4400 INJECTION INTRAVENOUS; SUBCUTANEOUS EVERY 6 HOURS
Refills: 0 | Status: DISCONTINUED | OUTPATIENT
Start: 2022-10-28 | End: 2022-10-29

## 2022-10-28 RX ORDER — HEPARIN SODIUM 5000 [USP'U]/ML
4400 INJECTION INTRAVENOUS; SUBCUTANEOUS ONCE
Refills: 0 | Status: DISCONTINUED | OUTPATIENT
Start: 2022-10-28 | End: 2022-10-29

## 2022-10-28 RX ORDER — ASPIRIN/CALCIUM CARB/MAGNESIUM 324 MG
81 TABLET ORAL DAILY
Refills: 0 | Status: DISCONTINUED | OUTPATIENT
Start: 2022-10-28 | End: 2022-11-01

## 2022-10-28 RX ORDER — HYDRALAZINE HCL 50 MG
50 TABLET ORAL THREE TIMES A DAY
Refills: 0 | Status: DISCONTINUED | OUTPATIENT
Start: 2022-10-28 | End: 2022-10-28

## 2022-10-28 RX ORDER — ACETAMINOPHEN 500 MG
650 TABLET ORAL EVERY 6 HOURS
Refills: 0 | Status: DISCONTINUED | OUTPATIENT
Start: 2022-10-28 | End: 2022-11-01

## 2022-10-28 RX ORDER — ONDANSETRON 8 MG/1
4 TABLET, FILM COATED ORAL EVERY 8 HOURS
Refills: 0 | Status: DISCONTINUED | OUTPATIENT
Start: 2022-10-28 | End: 2022-11-01

## 2022-10-28 RX ORDER — ATORVASTATIN CALCIUM 80 MG/1
20 TABLET, FILM COATED ORAL AT BEDTIME
Refills: 0 | Status: DISCONTINUED | OUTPATIENT
Start: 2022-10-28 | End: 2022-10-31

## 2022-10-28 RX ORDER — METOPROLOL TARTRATE 50 MG
50 TABLET ORAL DAILY
Refills: 0 | Status: DISCONTINUED | OUTPATIENT
Start: 2022-10-28 | End: 2022-11-01

## 2022-10-28 RX ORDER — HEPARIN SODIUM 5000 [USP'U]/ML
INJECTION INTRAVENOUS; SUBCUTANEOUS
Qty: 25000 | Refills: 0 | Status: DISCONTINUED | OUTPATIENT
Start: 2022-10-28 | End: 2022-10-29

## 2022-10-28 RX ORDER — HYDRALAZINE HCL 50 MG
5 TABLET ORAL ONCE
Refills: 0 | Status: COMPLETED | OUTPATIENT
Start: 2022-10-28 | End: 2022-10-28

## 2022-10-28 RX ORDER — AMLODIPINE BESYLATE 2.5 MG/1
5 TABLET ORAL DAILY
Refills: 0 | Status: DISCONTINUED | OUTPATIENT
Start: 2022-10-28 | End: 2022-10-30

## 2022-10-28 RX ORDER — LANOLIN ALCOHOL/MO/W.PET/CERES
3 CREAM (GRAM) TOPICAL AT BEDTIME
Refills: 0 | Status: DISCONTINUED | OUTPATIENT
Start: 2022-10-28 | End: 2022-11-01

## 2022-10-28 RX ORDER — LOSARTAN POTASSIUM 100 MG/1
100 TABLET, FILM COATED ORAL DAILY
Refills: 0 | Status: DISCONTINUED | OUTPATIENT
Start: 2022-10-28 | End: 2022-10-29

## 2022-10-28 RX ADMIN — Medication 5 MILLIGRAM(S): at 20:31

## 2022-10-28 NOTE — ED PROVIDER NOTE - OBJECTIVE STATEMENT
This patient is an 87 year old woman who presents to the ER c/o headache and palpitations.  She reports 12 days of headache.  She denies fall, injury or trauma.  The headache is described as sharp around the head and a heaviness at the top of the head.  No vision loss, no vomiting, no focal weakness no speech changes.  Patient reports high blood pressure readings at home.  She states that she has had similar headaches about a year ago also when her blood pressure was elevated.  She was seen by her PMD 3 days ago and her BP meds were adjusted: lisinopril increased from 10mg ot 20 mg.  Today she began experiencing palpitations today while at rest.  No chest pain or SOB.

## 2022-10-28 NOTE — ED ADULT NURSE NOTE - ED STAT RN HANDOFF DETAILS
handoff report given to ANKITA Jiang on 1D. RN made aware of pts current condition/lab values/reason for admission. pt is AOx3, resting comfortably in stretcher at this time. pt doesn't appear to be in any acute distress. pt is on cardiac monitor. pt is on room air. pts IV is patent and intact no redness/swelling noted at the site. no IV fluids running at this time. rounding and safety checks completed. pt is vitally stable at time of report. any issues endorsed to oncoming RN for followup.

## 2022-10-28 NOTE — ED PROVIDER NOTE - CLINICAL SUMMARY MEDICAL DECISION MAKING FREE TEXT BOX
Patient hypertensive in ER with headache neurologically intact will check CT r/o ICH; palpitations today will check electrolytes, troponin low suspicion for ACS; CXR Patient hypertensive in ER with headache neurologically intact will check CT r/o ICH; palpitations today will check electrolytes, troponin low suspicion for ACS; CXR and BP control.

## 2022-10-28 NOTE — ED PROVIDER NOTE - CARE PLAN
Principal Discharge DX:	Hypertensive urgency  Secondary Diagnosis:	NSTEMI (non-ST elevation myocardial infarction)   1

## 2022-10-28 NOTE — ED ADULT TRIAGE NOTE - WEIGHT IN KG
Left hip; surgical incision   All healed except of the most proximal where there is a small dehiscence with small serous drainage, no odor; periwound without erythema/heat/induration    Will apply antibacterial dressing; see wound care therapy for details    70.8

## 2022-10-28 NOTE — ED ADULT TRIAGE NOTE - CHIEF COMPLAINT QUOTE
as per patient c/o headache  x 12 days, palpitations starting today. Pt was seen by PMD 3 days, bp medication increased. Pt reports hypertensive today. /70 in triage  Denies chest pain  Hx: HTN

## 2022-10-28 NOTE — ED ADULT NURSE NOTE - NSIMPLEMENTINTERV_GEN_ALL_ED
Implemented All Fall with Harm Risk Interventions:  [place] to call system. Call bell, personal items and telephone within reach. Instruct patient to call for assistance. Room bathroom lighting operational. Non-slip footwear when patient is off stretcher. Physically safe environment: no spills, clutter or unnecessary equipment. Stretcher in lowest position, wheels locked, appropriate side rails in place. Provide visual cue, wrist band, yellow gown, etc. Monitor gait and stability. Monitor for mental status changes and reorient to person, place, and time. Review medications for side effects contributing to fall risk. Reinforce activity limits and safety measures with patient and family. Provide visual clues: red socks.

## 2022-10-28 NOTE — ED ADULT NURSE NOTE - OBJECTIVE STATEMENT
pt is AOx3, ambulatory. pt came in complaining of HTN/headache. as per pt, pt has had a headache for 12 days now and reports experiencing palpitations that started today. pt was seen by PMD 3 days ago and they increased her medication, pt took her BP today and noticed it was elevated so she came to the ED. pt describes her pain as a heaviness, pt states the pain is a 9/10. no edema present. pt denies any CP, SOB, fever, chills, N/V/D at this time.     Hx: HTN, HLD

## 2022-10-29 DIAGNOSIS — N18.30 CHRONIC KIDNEY DISEASE, STAGE 3 UNSPECIFIED: ICD-10-CM

## 2022-10-29 DIAGNOSIS — I16.1 HYPERTENSIVE EMERGENCY: ICD-10-CM

## 2022-10-29 DIAGNOSIS — I24.9 ACUTE ISCHEMIC HEART DISEASE, UNSPECIFIED: ICD-10-CM

## 2022-10-29 DIAGNOSIS — I10 ESSENTIAL (PRIMARY) HYPERTENSION: ICD-10-CM

## 2022-10-29 LAB
FLUAV AG NPH QL: SIGNIFICANT CHANGE UP
FLUBV AG NPH QL: SIGNIFICANT CHANGE UP
SARS-COV-2 RNA SPEC QL NAA+PROBE: SIGNIFICANT CHANGE UP
TROPONIN I, HIGH SENSITIVITY RESULT: 128.8 NG/L — HIGH
TROPONIN I, HIGH SENSITIVITY RESULT: 47.6 NG/L — SIGNIFICANT CHANGE UP

## 2022-10-29 PROCEDURE — 99223 1ST HOSP IP/OBS HIGH 75: CPT

## 2022-10-29 PROCEDURE — 12345: CPT | Mod: NC

## 2022-10-29 RX ORDER — INFLUENZA VIRUS VACCINE 15; 15; 15; 15 UG/.5ML; UG/.5ML; UG/.5ML; UG/.5ML
0.7 SUSPENSION INTRAMUSCULAR ONCE
Refills: 0 | Status: COMPLETED | OUTPATIENT
Start: 2022-10-29 | End: 2022-10-29

## 2022-10-29 RX ORDER — LISINOPRIL 2.5 MG/1
20 TABLET ORAL DAILY
Refills: 0 | Status: DISCONTINUED | OUTPATIENT
Start: 2022-10-29 | End: 2022-11-01

## 2022-10-29 RX ADMIN — ATORVASTATIN CALCIUM 20 MILLIGRAM(S): 80 TABLET, FILM COATED ORAL at 21:19

## 2022-10-29 RX ADMIN — LISINOPRIL 20 MILLIGRAM(S): 2.5 TABLET ORAL at 06:46

## 2022-10-29 RX ADMIN — Medication 650 MILLIGRAM(S): at 13:00

## 2022-10-29 RX ADMIN — Medication 50 MILLIGRAM(S): at 06:46

## 2022-10-29 RX ADMIN — AMLODIPINE BESYLATE 5 MILLIGRAM(S): 2.5 TABLET ORAL at 06:46

## 2022-10-29 RX ADMIN — Medication 650 MILLIGRAM(S): at 12:03

## 2022-10-29 RX ADMIN — Medication 81 MILLIGRAM(S): at 12:06

## 2022-10-29 NOTE — H&P ADULT - PROBLEM SELECTOR PLAN 1
pt with no CP + troponin. EKG non ischmic  Trend troponin  ECHO in AM  likely 2/2 HTN emergency causing a demand ischemia

## 2022-10-29 NOTE — H&P ADULT - PROBLEM SELECTOR PLAN 2
Pt BP >200 in ED on presentation  + troponin elevtion   headaches but neuro intact.   Cr elevated unknown baseline  lisinopril 20  Amlodipine 5  pt on bystolic 5 non formulary - switched for metoprolol 50  Hydralazine 50 TID held to not drop BP too much too fast  restart later today.

## 2022-10-29 NOTE — H&P ADULT - NSHPLABSRESULTS_GEN_ALL_CORE
=======================================================  Labs:                        12.2   9.83  )-----------( 208      ( 28 Oct 2022 20:21 )             36.9     10-28    143  |  111<H>  |  29<H>  ----------------------------<  114<H>  4.5   |  24  |  1.49<H>    Ca    9.9      28 Oct 2022 20:21    TPro  8.3  /  Alb  4.2  /  TBili  0.3  /  DBili  x   /  AST  12<L>  /  ALT  21  /  AlkPhos  57  10-28      Creatinine, Serum: 1.49 mg/dL (10-28-22 @ 20:21)            WBC Count: 9.83 K/uL (10-28-22 @ 20:21)    SARS-CoV-2 Result: NotDetec (10-29-22 @ 01:05)      Alkaline Phosphatase, Serum: 57 U/L (10-28-22 @ 20:21)  Alanine Aminotransferase (ALT/SGPT): 21 U/L (10-28-22 @ 20:21)  Aspartate Aminotransferase (AST/SGOT): 12 U/L (10-28-22 @ 20:21)  Bilirubin Total, Serum: 0.3 mg/dL (10-28-22 @ 20:21)      < from: CT Head No Cont (10.28.22 @ 20:58) >    IMPRESSION:    No acute intracranial hemorrhage, mass effect, or evidence of acute   vascular territorial infarction. If clinical symptoms persist or worsen,   more sensitive evaluation with brain MRI may be obtained, if no   contraindications exist.    --- End of Report ---      < end of copied text >

## 2022-10-29 NOTE — H&P ADULT - NSHPREVIEWOFSYSTEMS_GEN_ALL_CORE
REVIEW OF SYSTEMS:    CONSTITUTIONAL: No weakness, fevers or chills  EYES/ENT: No visual changes;  No vertigo or throat pain   NECK: No pain or stiffness  RESPIRATORY: No cough, wheezing, hemoptysis; No shortness of breath  CARDIOVASCULAR: No chest pain ,+ palpitations  GASTROINTESTINAL: No abdominal or epigastric pain. No nausea, vomiting, or hematemesis; No diarrhea or constipation. No melena or hematochezia.  GENITOURINARY: No dysuria, frequency or hematuria  NEUROLOGICAL: No numbness or weakness +headaches  SKIN: No itching, rashes

## 2022-10-29 NOTE — H&P ADULT - ASSESSMENT
Patient is an 87 year old woman withPMH HTN and CKD who presents to the ER c/o headache and palpitations. Troponin + non ischemic EKG

## 2022-10-29 NOTE — H&P ADULT - HISTORY OF PRESENT ILLNESS
Patient is an 87 year old woman who presents to the ER c/o headache and palpitations.  She reports 12 days of headache.  She denies fall, injury or trauma.  The headache is described as sharp around the head and a heaviness at the top of the head.  No vision loss, no vomiting, no focal weakness no speech changes.  Patient reports high blood pressure readings at home.  She states that she has had similar headaches about a year ago also when her blood pressure was elevated.  She was seen by her PMD 3 days ago and her BP meds were adjusted: lisinopril increased from 10mg ot 20 mg.  Today she began experiencing palpitations today while at rest.  No chest pain or SOB.

## 2022-10-29 NOTE — PATIENT PROFILE ADULT - FALL HARM RISK - HARM RISK INTERVENTIONS

## 2022-10-29 NOTE — H&P ADULT - NSHPPHYSICALEXAM_GEN_ALL_CORE
VITALS:   T(C): 36.9 (10-29-22 @ 04:01), Max: 37.4 (10-28-22 @ 18:08)  HR: 54 (10-29-22 @ 07:29) (54 - 78)  BP: 165/52 (10-29-22 @ 07:29) (153/55 - 209/70)  RR: 13 (10-29-22 @ 07:29) (13 - 18)  SpO2: 98% (10-29-22 @ 07:29) (97% - 98%)    GENERAL: NAD, lying in bed comfortably  HEAD:  Atraumatic, Normocephalic  EYES: EOMI, PERRLA, conjunctiva and sclera clear  ENT: Moist mucous membranes  NECK: Supple, No JVD  CHEST/LUNG: Clear to auscultation bilaterally;  Unlabored respirations  HEART: Regular rate and rhythm; No murmurs, rubs, or gallops  ABDOMEN: BSx4; Soft, nontender, nondistended  EXTREMITIES:  No clubbing, cyanosis, or edema  NERVOUS SYSTEM:  A&Ox3, no focal deficits   SKIN: No rashes or lesions

## 2022-10-29 NOTE — ED ADULT NURSE REASSESSMENT NOTE - NS ED NURSE REASSESS COMMENT FT1
As per hospitalist Rodri heparin drip will not be started at this time. MD Myers made aware to discontinue the heparin order.

## 2022-10-30 LAB
A1C WITH ESTIMATED AVERAGE GLUCOSE RESULT: 5.2 % — SIGNIFICANT CHANGE UP (ref 4–5.6)
ANION GAP SERPL CALC-SCNC: 6 MMOL/L — SIGNIFICANT CHANGE UP (ref 5–17)
BUN SERPL-MCNC: 23 MG/DL — SIGNIFICANT CHANGE UP (ref 7–23)
CALCIUM SERPL-MCNC: 9 MG/DL — SIGNIFICANT CHANGE UP (ref 8.5–10.1)
CHLORIDE SERPL-SCNC: 114 MMOL/L — HIGH (ref 96–108)
CHOLEST SERPL-MCNC: 128 MG/DL — SIGNIFICANT CHANGE UP
CO2 SERPL-SCNC: 24 MMOL/L — SIGNIFICANT CHANGE UP (ref 22–31)
CREAT SERPL-MCNC: 1.31 MG/DL — HIGH (ref 0.5–1.3)
EGFR: 40 ML/MIN/1.73M2 — LOW
ESTIMATED AVERAGE GLUCOSE: 103 MG/DL — SIGNIFICANT CHANGE UP (ref 68–114)
GLUCOSE SERPL-MCNC: 92 MG/DL — SIGNIFICANT CHANGE UP (ref 70–99)
HCT VFR BLD CALC: 31.5 % — LOW (ref 34.5–45)
HDLC SERPL-MCNC: 57 MG/DL — SIGNIFICANT CHANGE UP
HGB BLD-MCNC: 10.3 G/DL — LOW (ref 11.5–15.5)
LIPID PNL WITH DIRECT LDL SERPL: 53 MG/DL — SIGNIFICANT CHANGE UP
MAGNESIUM SERPL-MCNC: 2.4 MG/DL — SIGNIFICANT CHANGE UP (ref 1.6–2.6)
MCHC RBC-ENTMCNC: 29.6 PG — SIGNIFICANT CHANGE UP (ref 27–34)
MCHC RBC-ENTMCNC: 32.7 G/DL — SIGNIFICANT CHANGE UP (ref 32–36)
MCV RBC AUTO: 90.5 FL — SIGNIFICANT CHANGE UP (ref 80–100)
NON HDL CHOLESTEROL: 72 MG/DL — SIGNIFICANT CHANGE UP
NRBC # BLD: 0 /100 WBCS — SIGNIFICANT CHANGE UP (ref 0–0)
PHOSPHATE SERPL-MCNC: 2.7 MG/DL — SIGNIFICANT CHANGE UP (ref 2.5–4.5)
PLATELET # BLD AUTO: 174 K/UL — SIGNIFICANT CHANGE UP (ref 150–400)
POTASSIUM SERPL-MCNC: 4 MMOL/L — SIGNIFICANT CHANGE UP (ref 3.5–5.3)
POTASSIUM SERPL-SCNC: 4 MMOL/L — SIGNIFICANT CHANGE UP (ref 3.5–5.3)
RBC # BLD: 3.48 M/UL — LOW (ref 3.8–5.2)
RBC # FLD: 14.3 % — SIGNIFICANT CHANGE UP (ref 10.3–14.5)
SODIUM SERPL-SCNC: 144 MMOL/L — SIGNIFICANT CHANGE UP (ref 135–145)
TRIGL SERPL-MCNC: 93 MG/DL — SIGNIFICANT CHANGE UP
WBC # BLD: 7.5 K/UL — SIGNIFICANT CHANGE UP (ref 3.8–10.5)
WBC # FLD AUTO: 7.5 K/UL — SIGNIFICANT CHANGE UP (ref 3.8–10.5)

## 2022-10-30 PROCEDURE — 99233 SBSQ HOSP IP/OBS HIGH 50: CPT

## 2022-10-30 RX ORDER — AMLODIPINE BESYLATE 2.5 MG/1
10 TABLET ORAL DAILY
Refills: 0 | Status: DISCONTINUED | OUTPATIENT
Start: 2022-10-31 | End: 2022-11-01

## 2022-10-30 RX ORDER — AMLODIPINE BESYLATE 2.5 MG/1
5 TABLET ORAL ONCE
Refills: 0 | Status: COMPLETED | OUTPATIENT
Start: 2022-10-30 | End: 2022-10-30

## 2022-10-30 RX ADMIN — AMLODIPINE BESYLATE 5 MILLIGRAM(S): 2.5 TABLET ORAL at 11:17

## 2022-10-30 RX ADMIN — Medication 81 MILLIGRAM(S): at 11:17

## 2022-10-30 RX ADMIN — LISINOPRIL 20 MILLIGRAM(S): 2.5 TABLET ORAL at 05:07

## 2022-10-30 RX ADMIN — ATORVASTATIN CALCIUM 20 MILLIGRAM(S): 80 TABLET, FILM COATED ORAL at 21:23

## 2022-10-30 RX ADMIN — AMLODIPINE BESYLATE 5 MILLIGRAM(S): 2.5 TABLET ORAL at 05:07

## 2022-10-31 PROCEDURE — 93306 TTE W/DOPPLER COMPLETE: CPT | Mod: 26

## 2022-10-31 PROCEDURE — 70551 MRI BRAIN STEM W/O DYE: CPT | Mod: 26

## 2022-10-31 PROCEDURE — 99233 SBSQ HOSP IP/OBS HIGH 50: CPT

## 2022-10-31 RX ORDER — LOSARTAN/HYDROCHLOROTHIAZIDE 100MG-25MG
1 TABLET ORAL
Qty: 0 | Refills: 0 | DISCHARGE

## 2022-10-31 RX ORDER — CHOLECALCIFEROL (VITAMIN D3) 125 MCG
1 CAPSULE ORAL
Qty: 0 | Refills: 0 | DISCHARGE

## 2022-10-31 RX ORDER — OMEGA-3 ACID ETHYL ESTERS 1 G
1 CAPSULE ORAL
Refills: 0 | Status: DISCONTINUED | OUTPATIENT
Start: 2022-10-31 | End: 2022-11-01

## 2022-10-31 RX ORDER — ATORVASTATIN CALCIUM 80 MG/1
10 TABLET, FILM COATED ORAL AT BEDTIME
Refills: 0 | Status: DISCONTINUED | OUTPATIENT
Start: 2022-11-01 | End: 2022-11-01

## 2022-10-31 RX ORDER — TRAMADOL HYDROCHLORIDE 50 MG/1
1 TABLET ORAL
Qty: 0 | Refills: 0 | DISCHARGE

## 2022-10-31 RX ORDER — FERROUS SULFATE 325(65) MG
325 TABLET ORAL DAILY
Refills: 0 | Status: DISCONTINUED | OUTPATIENT
Start: 2022-10-31 | End: 2022-11-01

## 2022-10-31 RX ORDER — HYDRALAZINE HCL 50 MG
1 TABLET ORAL
Qty: 0 | Refills: 0 | DISCHARGE

## 2022-10-31 RX ORDER — CHOLECALCIFEROL (VITAMIN D3) 125 MCG
5000 CAPSULE ORAL DAILY
Refills: 0 | Status: DISCONTINUED | OUTPATIENT
Start: 2022-10-31 | End: 2022-11-01

## 2022-10-31 RX ORDER — ASPIRIN/CALCIUM CARB/MAGNESIUM 324 MG
1 TABLET ORAL
Qty: 0 | Refills: 0 | DISCHARGE

## 2022-10-31 RX ORDER — OMEGA-3 ACID ETHYL ESTERS 1 G
2 CAPSULE ORAL
Qty: 0 | Refills: 0 | DISCHARGE

## 2022-10-31 RX ORDER — LISINOPRIL 2.5 MG/1
0 TABLET ORAL
Qty: 0 | Refills: 0 | DISCHARGE

## 2022-10-31 RX ORDER — HYDRALAZINE HCL 50 MG
50 TABLET ORAL EVERY 8 HOURS
Refills: 0 | Status: DISCONTINUED | OUTPATIENT
Start: 2022-10-31 | End: 2022-11-01

## 2022-10-31 RX ADMIN — LISINOPRIL 20 MILLIGRAM(S): 2.5 TABLET ORAL at 05:19

## 2022-10-31 RX ADMIN — AMLODIPINE BESYLATE 10 MILLIGRAM(S): 2.5 TABLET ORAL at 05:19

## 2022-10-31 RX ADMIN — Medication 81 MILLIGRAM(S): at 11:39

## 2022-10-31 RX ADMIN — Medication 50 MILLIGRAM(S): at 21:24

## 2022-10-31 RX ADMIN — Medication 1 GRAM(S): at 17:29

## 2022-10-31 NOTE — PHYSICAL THERAPY INITIAL EVALUATION ADULT - GENERAL OBSERVATIONS, REHAB EVAL
Pt is alert, oriented x 4, coherent, follow directions, on room air, denies headache, pain or discomfort.

## 2022-10-31 NOTE — PROGRESS NOTE ADULT - PROBLEM SELECTOR PLAN 2
bp is better on today not optimal continue to titrate meds
bp is better on today not optimal continue to titrate meds  10/30/2022 bp better   f/u echo  10/31/2022 bp better await echo
bp is better on today not optimal continue to titrate meds  10/30/2022 bp better   f/u echo

## 2022-10-31 NOTE — PHARMACOTHERAPY INTERVENTION NOTE - COMMENTS
Recommended to change atorvastatin 20 mg to atorvastatin 10 mg based on patients home medications. 2017

## 2022-10-31 NOTE — PHYSICAL THERAPY INITIAL EVALUATION ADULT - PERTINENT HX OF CURRENT PROBLEM, REHAB EVAL
Pt states she came to this facility with c/o  headaches and palpitations, denies falls or trauma, vision loss, weakness or speech deficits.  CT 10/28 Brain- no acute intracranial hemorrhage, mass effect, or evidence of acute territorial infarction.

## 2022-10-31 NOTE — PROGRESS NOTE ADULT - SUBJECTIVE AND OBJECTIVE BOX
Patient is a 87y old  Female who presents with a chief complaint of Hypertensive emergency (29 Oct 2022 07:32)      OVERNIGHT EVENTS:    none      MEDICATIONS  (STANDING):  amLODIPine Oral Tab/Cap - Peds 5 milliGRAM(s) Oral daily  aspirin  chewable 81 milliGRAM(s) Oral daily  atorvastatin 20 milliGRAM(s) Oral at bedtime  lisinopril 20 milliGRAM(s) Oral daily  metoprolol succinate ER 50 milliGRAM(s) Oral daily    MEDICATIONS  (PRN):  acetaminophen     Tablet .. 650 milliGRAM(s) Oral every 6 hours PRN Temp greater or equal to 38C (100.4F), Mild Pain (1 - 3)  aluminum hydroxide/magnesium hydroxide/simethicone Suspension 30 milliLiter(s) Oral every 4 hours PRN Dyspepsia  melatonin 3 milliGRAM(s) Oral at bedtime PRN Insomnia  ondansetron Injectable 4 milliGRAM(s) IV Push every 8 hours PRN Nausea and/or Vomiting      Allergies    sulfa drugs (Rash (Mod to Severe))    Intolerances        SUBJECTIVE: in bed in NAD, no acute events overnight     T(F): 98.4 (10-29-22 @ 04:01), Max: 99.4 (10-28-22 @ 18:08)  HR: 61 (10-29-22 @ 11:28) (54 - 78)  BP: 163/57 (10-29-22 @ 11:28) (153/55 - 209/70)  RR: 17 (10-29-22 @ 11:28) (13 - 18)  SpO2: 97% (10-29-22 @ 11:28) (97% - 98%)  Wt(kg): --    PHYSICAL EXAM:  GENERAL: NAD, well-groomed, well-developed, elderly   HEAD:  Atraumatic, Normocephalic  EYES: EOMI, PERRLA, conjunctiva and sclera clear  ENMT: No tonsillar erythema, exudates, or enlargement; Moist mucous membranes, Good dentition, No lesions  NECK: Supple  CHEST/LUNG: Clear to  auscultation bilaterally; No rales, rhonchi, wheezing, or rubs  bilaterally  HEART: Regular rate and rhythm; No murmurs, rubs, or gallops  ABDOMEN: Soft, Nontender, Nondistended; Bowel sounds present  EXTREMITIES:  2+ Peripheral Pulses, No clubbing, cyanosis, or edema BL LE  SKIN: No rashes or lesions  NERVOUS SYSTEM:  Alert & Oriented X3, Good concentration; Motor Strength 5/5 B/L upper and lower extremities;   DTRs 2+ intact and symmetric, sensation intact BL    LABS:                        12.2   9.83  )-----------( 208      ( 28 Oct 2022 20:21 )             36.9     10-28    143  |  111<H>  |  29<H>  ----------------------------<  114<H>  4.5   |  24  |  1.49<H>    Ca    9.9      28 Oct 2022 20:21    TPro  8.3  /  Alb  4.2  /  TBili  0.3  /  DBili  x   /  AST  12<L>  /  ALT  21  /  AlkPhos  57  10-28    PT/INR - ( 28 Oct 2022 20:21 )   PT: 10.8 sec;   INR: 0.91 ratio             Cultures;   CAPILLARY BLOOD GLUCOSE        Lipid panel:     CARDIAC MARKERS ( 28 Oct 2022 20:21 )  x     / x     / 90 U/L / x     / x            RADIOLOGY & ADDITIONAL TESTS:      Imaging Personally Reviewed:  [ x] YES      Consultant(s) Notes Reviewed:  [x ] YES     Care Discussed with [x ] Consultants [X ] Patient [x ] Family  [x ]    [x ]  Other; RN
Patient is a 87y old  Female who presents with a chief complaint of Hypertensive emergency (29 Oct 2022 07:32)      OVERNIGHT EVENTS:  today : c/o headache    MEDICATIONS  (STANDING):  aspirin  chewable 81 milliGRAM(s) Oral daily  atorvastatin 20 milliGRAM(s) Oral at bedtime  influenza  Vaccine (HIGH DOSE) 0.7 milliLiter(s) IntraMuscular once  lisinopril 20 milliGRAM(s) Oral daily  metoprolol succinate ER 50 milliGRAM(s) Oral daily    MEDICATIONS  (PRN):  acetaminophen     Tablet .. 650 milliGRAM(s) Oral every 6 hours PRN Temp greater or equal to 38C (100.4F), Mild Pain (1 - 3)  aluminum hydroxide/magnesium hydroxide/simethicone Suspension 30 milliLiter(s) Oral every 4 hours PRN Dyspepsia  melatonin 3 milliGRAM(s) Oral at bedtime PRN Insomnia  ondansetron Injectable 4 milliGRAM(s) IV Push every 8 hours PRN Nausea and/or Vomiting      Allergies    sulfa drugs (Rash (Mod to Severe))    Intolerances        SUBJECTIVE: in bed in NAD, no acute events overnight   Vital Signs Last 24 Hrs  T(C): 36.7 (30 Oct 2022 16:57), Max: 36.8 (29 Oct 2022 22:52)  T(F): 98 (30 Oct 2022 16:57), Max: 98.3 (29 Oct 2022 22:52)  HR: 63 (30 Oct 2022 16:57) (53 - 82)  BP: 125/70 (30 Oct 2022 16:57) (125/70 - 174/66)  BP(mean): --  RR: 18 (30 Oct 2022 16:57) (16 - 20)  SpO2: 99% (30 Oct 2022 16:57) (97% - 99%)    Parameters below as of 30 Oct 2022 16:57  Patient On (Oxygen Delivery Method): room air      PHYSICAL EXAM:  GENERAL: NAD, well-groomed, well-developed, elderly   HEAD:  Atraumatic, Normocephalic  EYES: EOMI, PERRLA, conjunctiva and sclera clear  ENMT: No tonsillar erythema, exudates, or enlargement; Moist mucous membranes, Good dentition, No lesions  NECK: Supple  CHEST/LUNG: Clear to  auscultation bilaterally; No rales, rhonchi, wheezing, or rubs  bilaterally  HEART: Regular rate and rhythm; No murmurs, rubs, or gallops  ABDOMEN: Soft, Nontender, Nondistended; Bowel sounds present  EXTREMITIES:  2+ Peripheral Pulses, No clubbing, cyanosis, or edema BL LE  SKIN: No rashes or lesions  NERVOUS SYSTEM:  Alert & Oriented X3, Good concentration; Motor Strength 5/5 B/L upper and lower extremities;   DTRs 2+ intact and symmetric, sensation intact BL    LABS:                        12.2   9.83  )-----------( 208      ( 28 Oct 2022 20:21 )             36.9     10-28    143  |  111<H>  |  29<H>  ----------------------------<  114<H>  4.5   |  24  |  1.49<H>    Ca    9.9      28 Oct 2022 20:21    TPro  8.3  /  Alb  4.2  /  TBili  0.3  /  DBili  x   /  AST  12<L>  /  ALT  21  /  AlkPhos  57  10-28    PT/INR - ( 28 Oct 2022 20:21 )   PT: 10.8 sec;   INR: 0.91 ratio             Cultures;   CAPILLARY BLOOD GLUCOSE        Lipid panel:     CARDIAC MARKERS ( 28 Oct 2022 20:21 )  x     / x     / 90 U/L / x     / x            RADIOLOGY & ADDITIONAL TESTS:      Imaging Personally Reviewed:  [ x] YES      Consultant(s) Notes Reviewed:  [x ] YES     Care Discussed with [x ] Consultants [X ] Patient [x ] Family  [x ]    [x ]  Other; RN
Patient is a 87y old  Female who presents with a chief complaint of Hypertensive emergency (29 Oct 2022 07:32)      OVERNIGHT EVENTS:  none    MEDICATIONS  (STANDING):  amLODIPine   Tablet 10 milliGRAM(s) Oral daily  aspirin  chewable 81 milliGRAM(s) Oral daily  atorvastatin 20 milliGRAM(s) Oral at bedtime  influenza  Vaccine (HIGH DOSE) 0.7 milliLiter(s) IntraMuscular once  lisinopril 20 milliGRAM(s) Oral daily  metoprolol succinate ER 50 milliGRAM(s) Oral daily    MEDICATIONS  (PRN):  acetaminophen     Tablet .. 650 milliGRAM(s) Oral every 6 hours PRN Temp greater or equal to 38C (100.4F), Mild Pain (1 - 3)  aluminum hydroxide/magnesium hydroxide/simethicone Suspension 30 milliLiter(s) Oral every 4 hours PRN Dyspepsia  melatonin 3 milliGRAM(s) Oral at bedtime PRN Insomnia  ondansetron Injectable 4 milliGRAM(s) IV Push every 8 hours PRN Nausea and/or Vomiting        Allergies    sulfa drugs (Rash (Mod to Severe))    Intolerances    Vital Signs Last 24 Hrs  T(C): 36.9 (31 Oct 2022 10:35), Max: 36.9 (30 Oct 2022 23:36)  T(F): 98.5 (31 Oct 2022 10:35), Max: 98.5 (31 Oct 2022 10:35)  HR: 67 (31 Oct 2022 10:35) (55 - 67)  BP: 146/71 (31 Oct 2022 10:35) (115/54 - 146/71)  BP(mean): --  RR: 18 (31 Oct 2022 10:35) (18 - 18)  SpO2: 96% (31 Oct 2022 10:35) (94% - 99%)    Parameters below as of 30 Oct 2022 16:57  Patient On (Oxygen Delivery Method): room air        PHYSICAL EXAM:  GENERAL: NAD, well-groomed, well-developed, elderly   HEAD:  Atraumatic, Normocephalic  EYES: EOMI, PERRLA, conjunctiva and sclera clear  ENMT: No tonsillar erythema, exudates, or enlargement; Moist mucous membranes, Good dentition, No lesions  NECK: Supple  CHEST/LUNG: Clear to  auscultation bilaterally; No rales, rhonchi, wheezing, or rubs  bilaterally  HEART: Regular rate and rhythm; No murmurs, rubs, or gallops  ABDOMEN: Soft, Nontender, Nondistended; Bowel sounds present  EXTREMITIES:  2+ Peripheral Pulses, No clubbing, cyanosis, or edema BL LE  SKIN: No rashes or lesions  NERVOUS SYSTEM:  Alert & Oriented X3, Good concentration; Motor Strength 5/5 B/L upper and lower extremities;   DTRs 2+ intact and symmetric, sensation intact BL    LABS:                                              10.3   7.50  )-----------( 174      ( 30 Oct 2022 05:55 )             31.5   10-30    144  |  114<H>  |  23  ----------------------------<  92  4.0   |  24  |  1.31<H>    Ca    9.0      30 Oct 2022 05:55  Phos  2.7     10-30  Mg     2.4     10-30                Cultures;   CAPILLARY BLOOD GLUCOSE        Lipid panel:     CARDIAC MARKERS ( 28 Oct 2022 20:21 )  x     / x     / 90 U/L / x     / x            RADIOLOGY & ADDITIONAL TESTS:      Imaging Personally Reviewed:  [ x] YES      Consultant(s) Notes Reviewed:  [x ] YES     Care Discussed with [x ] Consultants [X ] Patient [x ] Family  [x ]    [x ]  Other; RN

## 2022-10-31 NOTE — PHARMACOTHERAPY INTERVENTION NOTE - INTERVENTION TYPE RECOOMEND
Therapy Recommended - Med Rec related

## 2022-10-31 NOTE — PHYSICAL THERAPY INITIAL EVALUATION ADULT - CRITERIA FOR SKILLED THERAPEUTIC INTERVENTIONS
Pt does not need skilled PT intervention, present functional ability is her baseline as confirmed by patient.

## 2022-10-31 NOTE — PROGRESS NOTE ADULT - PROBLEM SELECTOR PLAN 1
pt with no CP + troponin. EKG non ischemic  Trend troponin  ECHO in AM  likely 2/2 HTN emergency causing a demand ischemia    10/29/2022 obtain trop, continue with tele, f/u echo   ensure on asa   check a1c and lipid panel  10/30/2022 bp better    c/o persistent headache for approx. 12 days pta  get MRI, ct head noted
pt with no CP + troponin. EKG non ischemic  Trend troponin  ECHO in AM  likely 2/2 HTN emergency causing a demand ischemia    10/29/2022 obtain trop, continue with tele, f/u echo   ensure on asa   check a1c and lipid panel  10/30/2022 bp better    c/o persistent headache for approx. 12 days pta  get MRI, ct head noted  10/31/2022 await mri
pt with no CP + troponin. EKG non ischemic  Trend troponin  ECHO in AM  likely 2/2 HTN emergency causing a demand ischemia    10/29/2022 obtain trop, continue with tele, f/u echo   ensure on asa   check a1c and lipid panel

## 2022-10-31 NOTE — PHARMACOTHERAPY INTERVENTION NOTE - COMMENTS
Contacted patient's pharmacy and performed a medication reconciliation. Patient is currently on d  - Lovaza 1 g by mouth twice a day  - Bystolic 5 mg by mouth once a day  - Lisinopril 20 mg by mouth once a day  - Hydralazine 50 mg by mouth four times a day   - Rosuvastatin 5 mg by mouth once a day  - Amlodipine 5 mg by mouth once a day  - Vitamin D3 5000 units by mouth once a day  - Ferrous sulfate 324 mg by mouth once a day    OMR has been updated. Contacted patient's pharmacy and performed a medication reconciliation. Patient is currently on:  - Lovaza 1 g by mouth twice a day  - Bystolic 5 mg by mouth once a day  - Lisinopril 20 mg by mouth once a day  - Hydralazine 50 mg by mouth four times a day   - Rosuvastatin 5 mg by mouth once a day  - Amlodipine 5 mg by mouth once a day  - Vitamin D3 5000 units by mouth once a day  - Ferrous sulfate 324 mg by mouth once a day    OMR has been updated.

## 2022-10-31 NOTE — PHYSICAL THERAPY INITIAL EVALUATION ADULT - FOLLOWS COMMANDS/ANSWERS QUESTIONS, REHAB EVAL
Medication teaching and assessment/Observation and assessment/Teaching and training
100% of the time

## 2022-10-31 NOTE — PHYSICAL THERAPY INITIAL EVALUATION ADULT - ADDITIONAL COMMENTS
Pt has own cane at bedside, as per patient inside the house he uses a cane intermittently except outdoors she uses it constantly.

## 2022-10-31 NOTE — PHYSICAL THERAPY INITIAL EVALUATION ADULT - DIAGNOSIS, PT EVAL
Pt presented with ability to do functional tasks -bed mobility, transfers and ambulation using cane with supervision to independent. (pt states this is her baseline function)

## 2022-10-31 NOTE — PHYSICAL THERAPY INITIAL EVALUATION ADULT - LIVES WITH, PROFILE
Pt states she lives in pvt house with daughter, son in law nad grand son, 4 steps to enter the house with rails, stays mainly on main floor with all amenities.

## 2022-11-01 ENCOUNTER — TRANSCRIPTION ENCOUNTER (OUTPATIENT)
Age: 87
End: 2022-11-01

## 2022-11-01 VITALS
OXYGEN SATURATION: 96 % | RESPIRATION RATE: 18 BRPM | HEART RATE: 68 BPM | SYSTOLIC BLOOD PRESSURE: 148 MMHG | TEMPERATURE: 98 F | DIASTOLIC BLOOD PRESSURE: 74 MMHG

## 2022-11-01 LAB
ANION GAP SERPL CALC-SCNC: 3 MMOL/L — LOW (ref 5–17)
BUN SERPL-MCNC: 34 MG/DL — HIGH (ref 7–23)
CALCIUM SERPL-MCNC: 9 MG/DL — SIGNIFICANT CHANGE UP (ref 8.5–10.1)
CHLORIDE SERPL-SCNC: 114 MMOL/L — HIGH (ref 96–108)
CO2 SERPL-SCNC: 25 MMOL/L — SIGNIFICANT CHANGE UP (ref 22–31)
CREAT SERPL-MCNC: 1.47 MG/DL — HIGH (ref 0.5–1.3)
EGFR: 35 ML/MIN/1.73M2 — LOW
GLUCOSE SERPL-MCNC: 95 MG/DL — SIGNIFICANT CHANGE UP (ref 70–99)
HCT VFR BLD CALC: 31.8 % — LOW (ref 34.5–45)
HGB BLD-MCNC: 10.4 G/DL — LOW (ref 11.5–15.5)
MAGNESIUM SERPL-MCNC: 2.2 MG/DL — SIGNIFICANT CHANGE UP (ref 1.6–2.6)
MCHC RBC-ENTMCNC: 30.1 PG — SIGNIFICANT CHANGE UP (ref 27–34)
MCHC RBC-ENTMCNC: 32.7 G/DL — SIGNIFICANT CHANGE UP (ref 32–36)
MCV RBC AUTO: 92.2 FL — SIGNIFICANT CHANGE UP (ref 80–100)
NRBC # BLD: 0 /100 WBCS — SIGNIFICANT CHANGE UP (ref 0–0)
PHOSPHATE SERPL-MCNC: 3.3 MG/DL — SIGNIFICANT CHANGE UP (ref 2.5–4.5)
PLATELET # BLD AUTO: 174 K/UL — SIGNIFICANT CHANGE UP (ref 150–400)
POTASSIUM SERPL-MCNC: 4.8 MMOL/L — SIGNIFICANT CHANGE UP (ref 3.5–5.3)
POTASSIUM SERPL-SCNC: 4.8 MMOL/L — SIGNIFICANT CHANGE UP (ref 3.5–5.3)
RBC # BLD: 3.45 M/UL — LOW (ref 3.8–5.2)
RBC # FLD: 14.2 % — SIGNIFICANT CHANGE UP (ref 10.3–14.5)
SODIUM SERPL-SCNC: 142 MMOL/L — SIGNIFICANT CHANGE UP (ref 135–145)
WBC # BLD: 8.51 K/UL — SIGNIFICANT CHANGE UP (ref 3.8–10.5)
WBC # FLD AUTO: 8.51 K/UL — SIGNIFICANT CHANGE UP (ref 3.8–10.5)

## 2022-11-01 PROCEDURE — 99239 HOSP IP/OBS DSCHRG MGMT >30: CPT

## 2022-11-01 RX ORDER — HYDRALAZINE HCL 50 MG
1 TABLET ORAL
Qty: 0 | Refills: 0 | DISCHARGE

## 2022-11-01 RX ORDER — HYDRALAZINE HCL 50 MG
1 TABLET ORAL
Qty: 90 | Refills: 0
Start: 2022-11-01 | End: 2022-11-30

## 2022-11-01 RX ORDER — METOPROLOL TARTRATE 50 MG
1 TABLET ORAL
Qty: 0 | Refills: 0 | DISCHARGE
Start: 2022-11-01

## 2022-11-01 RX ORDER — AMLODIPINE BESYLATE 2.5 MG/1
1 TABLET ORAL
Qty: 30 | Refills: 0
Start: 2022-11-01 | End: 2022-11-30

## 2022-11-01 RX ORDER — FERROUS SULFATE 325(65) MG
1 TABLET ORAL
Qty: 0 | Refills: 0 | DISCHARGE
Start: 2022-11-01

## 2022-11-01 RX ORDER — AMLODIPINE BESYLATE 2.5 MG/1
0 TABLET ORAL
Qty: 0 | Refills: 0 | DISCHARGE

## 2022-11-01 RX ORDER — ROSUVASTATIN CALCIUM 5 MG/1
1 TABLET ORAL
Qty: 0 | Refills: 0 | DISCHARGE

## 2022-11-01 RX ORDER — LISINOPRIL 2.5 MG/1
1 TABLET ORAL
Qty: 30 | Refills: 0
Start: 2022-11-01 | End: 2022-11-30

## 2022-11-01 RX ORDER — LISINOPRIL 2.5 MG/1
1 TABLET ORAL
Qty: 0 | Refills: 0 | DISCHARGE

## 2022-11-01 RX ORDER — NEBIVOLOL HYDROCHLORIDE 5 MG/1
1 TABLET ORAL
Qty: 0 | Refills: 0 | DISCHARGE

## 2022-11-01 RX ORDER — ROSUVASTATIN CALCIUM 5 MG/1
1 TABLET ORAL
Qty: 30 | Refills: 0
Start: 2022-11-01 | End: 2022-11-30

## 2022-11-01 RX ADMIN — Medication 50 MILLIGRAM(S): at 14:31

## 2022-11-01 RX ADMIN — Medication 81 MILLIGRAM(S): at 11:44

## 2022-11-01 RX ADMIN — Medication 1 GRAM(S): at 06:49

## 2022-11-01 RX ADMIN — AMLODIPINE BESYLATE 10 MILLIGRAM(S): 2.5 TABLET ORAL at 06:49

## 2022-11-01 RX ADMIN — LISINOPRIL 20 MILLIGRAM(S): 2.5 TABLET ORAL at 06:49

## 2022-11-01 RX ADMIN — Medication 1 GRAM(S): at 17:48

## 2022-11-01 RX ADMIN — Medication 50 MILLIGRAM(S): at 06:51

## 2022-11-01 RX ADMIN — Medication 650 MILLIGRAM(S): at 12:50

## 2022-11-01 RX ADMIN — Medication 50 MILLIGRAM(S): at 06:49

## 2022-11-01 RX ADMIN — Medication 325 MILLIGRAM(S): at 11:44

## 2022-11-01 RX ADMIN — Medication 650 MILLIGRAM(S): at 11:47

## 2022-11-01 RX ADMIN — Medication 5000 UNIT(S): at 11:45

## 2022-11-01 NOTE — DISCHARGE NOTE NURSING/CASE MANAGEMENT/SOCIAL WORK - NSDCPEFALRISK_GEN_ALL_CORE
For information on Fall & Injury Prevention, visit: [email or web address] OR  [email or web address] OR  https://www.cdc.gov/steadi/patient.html

## 2022-11-01 NOTE — DISCHARGE NOTE PROVIDER - NSDCMRMEDTOKEN_GEN_ALL_CORE_FT
amLODIPine 10 mg oral tablet: 1 tab(s) orally once a day  aspirin 81 mg oral tablet, chewable: 1 tab(s) orally once a day  Calcium 500+D oral tablet, chewable: 1 tab(s) orally 2 times a day  ferrous sulfate 325 mg (65 mg elemental iron) oral tablet: 1 tab(s) orally once a day  hydrALAZINE 50 mg oral tablet: 1 tab(s) orally every 8 hours  lisinopril 20 mg oral tablet: 1 tab(s) orally once a day  Lovaza 1000 mg oral capsule: 2 cap(s) orally 2 times a day  metoprolol succinate 50 mg oral tablet, extended release: 1 tab(s) orally once a day  rosuvastatin 5 mg oral tablet: 1 tab(s) orally once a day (at bedtime)  Vitamin D3 125 mcg (5000 intl units) oral capsule: 1 cap(s) orally once a day

## 2022-11-01 NOTE — DISCHARGE NOTE PROVIDER - NSDCFUSCHEDAPPT_GEN_ALL_CORE_FT
Antwan Mcclain  [facility] Physician [facility]  [address]  Scheduled Appointment: 01/30/2023

## 2022-11-01 NOTE — DISCHARGE NOTE PROVIDER - ATTENDING DISCHARGE PHYSICAL EXAMINATION:
PHYSICAL EXAM:  GENERAL: NAD, well-groomed, well-developed, elderly   HEAD:  Atraumatic, Normocephalic  EYES: EOMI, PERRLA, conjunctiva and sclera clear  ENMT: No tonsillar erythema, exudates, or enlargement; Moist mucous membranes, Good dentition, No lesions  NECK: Supple  CHEST/LUNG: Clear to  auscultation bilaterally; No rales, rhonchi, wheezing, or rubs  bilaterally  HEART: Regular rate and rhythm; No murmurs, rubs, or gallops  ABDOMEN: Soft, Nontender, Nondistended; Bowel sounds present  EXTREMITIES:  2+ Peripheral Pulses, No clubbing, cyanosis, or edema BL LE  SKIN: No rashes or lesions  NERVOUS SYSTEM:  Alert & Oriented X3, Good concentration; Motor Strength 5/5 B/L upper and lower extremities;   DTRs 2+ intact and symmetric, sensation intact BL

## 2022-11-01 NOTE — DISCHARGE NOTE PROVIDER - NSDCCPCAREPLAN_GEN_ALL_CORE_FT
PRINCIPAL DISCHARGE DIAGNOSIS  Diagnosis: Hypertensive emergency  Assessment and Plan of Treatment:       SECONDARY DISCHARGE DIAGNOSES  Diagnosis: Stage 3 chronic kidney disease  Assessment and Plan of Treatment:     Diagnosis: NSTEMI (non-ST elevation myocardial infarction)  Assessment and Plan of Treatment:     Diagnosis: Chronic diastolic congestive heart failure  Assessment and Plan of Treatment:

## 2022-11-01 NOTE — DISCHARGE NOTE NURSING/CASE MANAGEMENT/SOCIAL WORK - PATIENT PORTAL LINK FT
You can access the FollowMyHealth Patient Portal offered by [facility] by registering at the following website: [email or web address]. By joining [facility]’s FollowMyHealth portal, you will also be able to view your health information using other applications (apps) compatible with our system.

## 2022-11-01 NOTE — DISCHARGE NOTE PROVIDER - HOSPITAL COURSE
Assessment and Plan:   · Assessment	  Patient is an 87 year old woman withPMH HTN and CKD who presents to the ER c/o headache and palpitations. Troponin + non ischemic EKG     Problem/Plan - 1:  ·  Problem: ACS (acute coronary syndrome).   ·  Plan: pt with no CP + troponin. EKG non ischemic  Trend troponin  ECHO in AM  likely 2/2 HTN emergency causing a demand ischemia    10/29/2022 obtain trop, continue with tele, f/u echo   ensure on asa   check a1c and lipid panel  10/30/2022 bp better    c/o persistent headache for approx. 12 days pta  get MRI, ct head noted  10/31/2022 await mri.  11/1/2022 mri no acute cva, echo c/w grade 1 dd- chf     Problem/Plan - 2:  ·  Problem: Hypertensive emergency.   ·  Plan: bp is better on today not optimal continue to titrate meds  10/30/2022 bp better   f/u echo  10/31/2022 bp better await echo.    11/1/2022 mri no acute cva, echo c/w grade 1 dd- chf     Problem/Plan - 3:  ·  Problem: Stage 3 chronic kidney disease.   ·  Plan: at baseline stage 3b.

## 2022-11-01 NOTE — DISCHARGE NOTE NURSING/CASE MANAGEMENT/SOCIAL WORK - NSDCPEPT PROEDHF_GEN_ALL_CORE
Call to Radiologist. He states that this is low dose scan so it may not have identified as much as the atherosclerotic calcification last year.   Also, a different Radiologist read the report last year, so Dr Ruvalcaba cannot speak as to why Dr Crocker didn't have the bony changes and the atherosclerotic changes in the report.     The spinal changes are normal for her age, so not all radiologists are going to add this.        Call primary care provider for follow up after discharge/Activities as tolerated/Low salt diet/Monitor weight daily/Report signs and symptoms to primary care provider

## 2022-11-09 DIAGNOSIS — I50.32 CHRONIC DIASTOLIC (CONGESTIVE) HEART FAILURE: ICD-10-CM

## 2022-11-09 DIAGNOSIS — I24.8 OTHER FORMS OF ACUTE ISCHEMIC HEART DISEASE: ICD-10-CM

## 2022-11-09 DIAGNOSIS — I16.1 HYPERTENSIVE EMERGENCY: ICD-10-CM

## 2022-11-09 DIAGNOSIS — Z79.82 LONG TERM (CURRENT) USE OF ASPIRIN: ICD-10-CM

## 2022-11-09 DIAGNOSIS — Z88.2 ALLERGY STATUS TO SULFONAMIDES: ICD-10-CM

## 2022-11-09 DIAGNOSIS — M31.6 OTHER GIANT CELL ARTERITIS: ICD-10-CM

## 2022-11-09 DIAGNOSIS — N18.32 CHRONIC KIDNEY DISEASE, STAGE 3B: ICD-10-CM

## 2022-11-09 DIAGNOSIS — I13.0 HYPERTENSIVE HEART AND CHRONIC KIDNEY DISEASE WITH HEART FAILURE AND STAGE 1 THROUGH STAGE 4 CHRONIC KIDNEY DISEASE, OR UNSPECIFIED CHRONIC KIDNEY DISEASE: ICD-10-CM

## 2023-01-18 NOTE — PATIENT PROFILE ADULT - FUNCTIONAL SCREEN CURRENT LEVEL: SWALLOWING (IF SCORE 2 OR MORE FOR ANY ITEM, CONSULT REHAB SERVICES), MLM)
0 = swallows foods/liquids without difficulty Acitretin Pregnancy And Lactation Text: This medication is Pregnancy Category X and should not be given to women who are pregnant or may become pregnant in the future. This medication is excreted in breast milk.

## 2023-01-30 ENCOUNTER — APPOINTMENT (OUTPATIENT)
Dept: NEPHROLOGY | Facility: CLINIC | Age: 88
End: 2023-01-30
Payer: MEDICARE

## 2023-01-30 VITALS
WEIGHT: 166 LBS | OXYGEN SATURATION: 99 % | HEART RATE: 61 BPM | DIASTOLIC BLOOD PRESSURE: 73 MMHG | SYSTOLIC BLOOD PRESSURE: 160 MMHG | TEMPERATURE: 98.2 F | BODY MASS INDEX: 29.41 KG/M2 | HEIGHT: 63 IN | RESPIRATION RATE: 14 BRPM

## 2023-01-30 DIAGNOSIS — I10 ESSENTIAL (PRIMARY) HYPERTENSION: ICD-10-CM

## 2023-01-30 PROCEDURE — 99213 OFFICE O/P EST LOW 20 MIN: CPT

## 2023-01-30 RX ORDER — NEBIVOLOL HYDROCHLORIDE 5 MG/1
5 TABLET ORAL DAILY
Refills: 0 | Status: DISCONTINUED | COMMUNITY
Start: 2019-02-12 | End: 2023-01-30

## 2023-01-30 RX ORDER — TRAMADOL HYDROCHLORIDE 50 MG/1
50 TABLET, COATED ORAL
Refills: 0 | Status: DISCONTINUED | COMMUNITY
Start: 2019-02-12 | End: 2023-01-30

## 2023-01-30 RX ORDER — METOPROLOL SUCCINATE 50 MG/1
50 TABLET, EXTENDED RELEASE ORAL
Qty: 90 | Refills: 2 | Status: ACTIVE | COMMUNITY
Start: 2023-01-30

## 2023-01-30 RX ORDER — LISINOPRIL 20 MG/1
20 TABLET ORAL
Qty: 30 | Refills: 0 | Status: ACTIVE | COMMUNITY
Start: 2019-08-23

## 2023-01-30 NOTE — HISTORY OF PRESENT ILLNESS
[FreeTextEntry1] : Ms. WALDROP is a 87 year old female with  PMH HTN, HLD, and migraines here for recently noted LOCO,  her baseline crt in 1.2-1.3 range was in [facility] ER on 2/9/19 with sharp right lower pleuritic chest pain and subjective fevers with chills for the last 2-3 days. CT angio obtained(with contrast on 2/9) to rule out PE but it showed bl Lower lobe infiltrates, switch to levaquin po, to f/u with pcp post-discharge.  Her crt on 2/9 was 1.3mg/dl and then janelle during admission to 1.7 and came down to 1.5 on dc with hydration. Elevation in scr was thought to be from being on ace/hctz during acute event. \par \par In Oct 2020, she has been doing well, she got COVID19 vaccine (J&J) 2 weeks ago. Currently no issues. She is taking ASA.  Denies cp/sob/le edema, PND, n/v/abdominal pain.  \par \par In Oct 2021, she was stable and no major issue\par Since last visit, was hospitalized in [place] again with HTN and demand ischemia. Meds were updated and changed here in the system. She then saw cards- ECHO normal per daughter and normal stress test. No cardiac cath was done.\par \par She has ongoing HTN, bp at home in 150-160SBP\par Pt here with grand daughter

## 2023-01-30 NOTE — ASSESSMENT
[FreeTextEntry1] : Ms. WALDROP is a 87 year old female with known HTN and potentially CKD stage 3 from HTN for years had episode of LOCO in 2019, likely from contrast nephropathy and pre renal azotemia that was resolving following hydration. In the most recent hospital stay in 2022 in Oct, crt was 1.4 range mostly. No major changes\par \par #CKD Stage 3\par Pt with stable CKD, no acute events.\par last Cr at 1.47\par will need repeated labs today (renal panel, UA, prot/cr ratio, CBC)\par avoid NSAIDS, nephrotoxic drugs, IV contrast\par \par HTN\par Not ideal, i have a high suspicion that this is hyperaldo state\par low salt diet \par taking amlodipine 5mg po day and lisinopril 20mg and now on metoprolol and hydralazine as well\par \par Polypharmacy\par She may benefit from coming off several meds\par Pt to discuss with PMD\par \par f.u 5 months

## 2023-01-30 NOTE — PHYSICAL EXAM
[General Appearance - Alert] : alert [General Appearance - In No Acute Distress] : in no acute distress [Auscultation Breath Sounds / Voice Sounds] : lungs were clear to auscultation bilaterally [Heart Rate And Rhythm] : heart rate was normal and rhythm regular [Heart Sounds] : normal S1 and S2 [Heart Sounds Gallop] : no gallops [Heart Sounds Pericardial Friction Rub] : no pericardial rub [Systolic grade ___/6] : A grade [unfilled]/6 systolic murmur was heard. [FreeTextEntry1] : 2+ edema of LE. [No CVA Tenderness] : no ~M costovertebral angle tenderness [] : no rash [No Focal Deficits] : no focal deficits

## 2023-01-30 NOTE — REVIEW OF SYSTEMS
[Confused] : no confusion [Dizziness] : no dizziness [Fainting] : no fainting [Anxiety] : no anxiety [Depression] : no depression [Muscle Weakness] : no muscle weakness [Feelings Of Weakness] : no feelings of weakness [Swollen Glands] : no swollen glands [Swollen Glands In The Neck] : no swollen glands in the neck [Negative] : Integumentary

## 2023-01-31 LAB
ALBUMIN SERPL ELPH-MCNC: 4.7 G/DL
ALDOSTERONE SERUM: 8.8 NG/DL
ANION GAP SERPL CALC-SCNC: 13 MMOL/L
APPEARANCE: CLEAR
BACTERIA: NEGATIVE
BASOPHILS # BLD AUTO: 0.07 K/UL
BASOPHILS NFR BLD AUTO: 0.8 %
BILIRUBIN URINE: NEGATIVE
BLOOD URINE: NEGATIVE
BUN SERPL-MCNC: 34 MG/DL
C3 SERPL-MCNC: 121 MG/DL
C4 SERPL-MCNC: 31 MG/DL
CALCIUM SERPL-MCNC: 9.8 MG/DL
CHLORIDE SERPL-SCNC: 110 MMOL/L
CO2 SERPL-SCNC: 21 MMOL/L
COLOR: NORMAL
CREAT SERPL-MCNC: 1.57 MG/DL
CREAT SPEC-SCNC: 82 MG/DL
CREAT/PROT UR: 0.4 RATIO
DEPRECATED KAPPA LC FREE/LAMBDA SER: 1.93 RATIO
EGFR: 32 ML/MIN/1.73M2
EOSINOPHIL # BLD AUTO: 0.22 K/UL
EOSINOPHIL NFR BLD AUTO: 2.5 %
GLUCOSE QUALITATIVE U: NEGATIVE
GLUCOSE SERPL-MCNC: 100 MG/DL
HCT VFR BLD CALC: 33.2 %
HGB BLD-MCNC: 10.7 G/DL
HYALINE CASTS: 0 /LPF
IMM GRANULOCYTES NFR BLD AUTO: 0.2 %
KAPPA LC CSF-MCNC: 3.34 MG/DL
KAPPA LC SERPL-MCNC: 6.43 MG/DL
KETONES URINE: NEGATIVE
LEUKOCYTE ESTERASE URINE: NEGATIVE
LYMPHOCYTES # BLD AUTO: 3.05 K/UL
LYMPHOCYTES NFR BLD AUTO: 34.6 %
MAN DIFF?: NORMAL
MCHC RBC-ENTMCNC: 31.4 PG
MCHC RBC-ENTMCNC: 32.2 GM/DL
MCV RBC AUTO: 97.4 FL
MICROSCOPIC-UA: NORMAL
MONOCYTES # BLD AUTO: 0.68 K/UL
MONOCYTES NFR BLD AUTO: 7.7 %
NEUTROPHILS # BLD AUTO: 4.78 K/UL
NEUTROPHILS NFR BLD AUTO: 54.2 %
NITRITE URINE: NEGATIVE
PH URINE: 6
PHOSPHATE SERPL-MCNC: 3.4 MG/DL
PLATELET # BLD AUTO: 223 K/UL
POTASSIUM SERPL-SCNC: 5.3 MMOL/L
PROT UR-MCNC: 30 MG/DL
PROTEIN URINE: ABNORMAL
RBC # BLD: 3.41 M/UL
RBC # FLD: 14.7 %
RED BLOOD CELLS URINE: 1 /HPF
SODIUM SERPL-SCNC: 144 MMOL/L
SPECIFIC GRAVITY URINE: 1.01
SQUAMOUS EPITHELIAL CELLS: 3 /HPF
UROBILINOGEN URINE: NORMAL
WBC # FLD AUTO: 8.82 K/UL
WHITE BLOOD CELLS URINE: 3 /HPF

## 2023-02-02 LAB
M PROTEIN SPEC IFE-MCNC: NORMAL
PHOSPHOLIPASE A2 RECEPTOR ELISA: 2.1 RU/ML

## 2023-02-06 LAB — RENIN ACTIVITY, PLASMA: 0.21 NG/ML/HR

## 2023-03-09 ENCOUNTER — NON-APPOINTMENT (OUTPATIENT)
Age: 88
End: 2023-03-09

## 2023-05-03 ENCOUNTER — APPOINTMENT (OUTPATIENT)
Dept: NEPHROLOGY | Facility: CLINIC | Age: 88
End: 2023-05-03
Payer: MEDICARE

## 2023-05-03 VITALS — SYSTOLIC BLOOD PRESSURE: 145 MMHG | DIASTOLIC BLOOD PRESSURE: 68 MMHG | BODY MASS INDEX: 29.41 KG/M2 | WEIGHT: 166 LBS

## 2023-05-03 DIAGNOSIS — N18.2 CHRONIC KIDNEY DISEASE, STAGE 2 (MILD): ICD-10-CM

## 2023-05-03 PROCEDURE — 99213 OFFICE O/P EST LOW 20 MIN: CPT | Mod: 95

## 2023-05-03 RX ORDER — HYDRALAZINE HYDROCHLORIDE 50 MG/1
50 TABLET ORAL
Qty: 270 | Refills: 0 | Status: ACTIVE | COMMUNITY
Start: 2019-02-12

## 2023-05-03 NOTE — ASSESSMENT
[FreeTextEntry1] : Ms. WALDROP is a 87 year old female with known HTN and potentially CKD stage 3 from HTN for years had episode of LOCO in 2019, likely from contrast nephropathy and pre renal azotemia that was resolving following hydration. In the most recent hospital stay in 2022 in Oct, crt was 1.4 range mostly. No major changes\par \par #CKD Stage 3\par Pt with stable CKD, no acute events.\par last Cr at 1.4-1.5 range, stable\par \par Anemia-\par not ckd related likely\par needs f,u workup for ongoing causes\par \par HTN\par No signs of hyperaldo on labs\par low salt diet \par taking amlodipine 5mg po day and lisinopril 20mg and now on metoprolol and hydralazine as well\par back on 4 times a day hydralazine\par \par Polypharmacy\par She may benefit from coming off several meds\par Pt to discuss with PMD\par \par f.u 7 months

## 2023-05-03 NOTE — REASON FOR VISIT
[Home] : at home, [unfilled] , at the time of the visit. [Medical Office: ([facility] [place])___] : at the medical office located in  [Family Member] : family member [Patient] : the patient [Follow-Up] : a follow-up visit

## 2023-05-03 NOTE — HISTORY OF PRESENT ILLNESS
[FreeTextEntry1] : Ms. WALDROP is a 87 year old female with  PMH HTN, HLD, and migraines here for recently noted LOCO,  her baseline crt in 1.2-1.3 range was in [facility] ER on 2/9/19 with sharp right lower pleuritic chest pain and subjective fevers with chills for the last 2-3 days. CT angio obtained(with contrast on 2/9) to rule out PE but it showed bl Lower lobe infiltrates, switch to levaquin po, to f/u with pcp post-discharge.  Her crt on 2/9 was 1.3mg/dl and then janelle during admission to 1.7 and came down to 1.5 on dc with hydration. Elevation in scr was thought to be from being on ace/hctz during acute event. \par \par In Oct 2020, she has been doing well, she got COVID19 vaccine (J&J) 2 weeks ago. Currently no issues. She is taking ASA.  Denies cp/sob/le edema, PND, n/v/abdominal pain.  \par \par In Oct 2021, she was stable and no major issue\par Since last visit, was hospitalized in [place] again with HTN and demand ischemia. Meds were updated and changed here in the system. She then saw cards- ECHO normal per daughter and normal stress test. No cardiac cath was done.\par \par She has ongoing HTN, bp at home in 145 range\par Pt here with grand daughter\par Since last visit, anemia workup is pending\par BP meds unchanged per PMD for now

## 2023-05-09 ENCOUNTER — APPOINTMENT (OUTPATIENT)
Dept: NEPHROLOGY | Facility: CLINIC | Age: 88
End: 2023-05-09

## 2023-09-11 NOTE — PROGRESS NOTE ADULT - PROBLEM/PLAN-2
DISPLAY PLAN FREE TEXT Patient/Caregiver provided printed discharge information. Peng Advancement Flap Text: The defect edges were debeveled with a #15 scalpel blade.  Given the location of the defect, shape of the defect and the proximity to free margins a Peng advancement flap was deemed most appropriate.  Using a sterile surgical marker, an appropriate advancement flap was drawn incorporating the defect and placing the expected incisions within the relaxed skin tension lines where possible. The area thus outlined was incised deep to adipose tissue with a #15 scalpel blade.  The skin margins were undermined to an appropriate distance in all directions utilizing iris scissors.

## 2023-12-13 ENCOUNTER — APPOINTMENT (OUTPATIENT)
Dept: NEPHROLOGY | Facility: CLINIC | Age: 88
End: 2023-12-13
